# Patient Record
Sex: MALE | Race: WHITE | NOT HISPANIC OR LATINO | Employment: OTHER | URBAN - METROPOLITAN AREA
[De-identification: names, ages, dates, MRNs, and addresses within clinical notes are randomized per-mention and may not be internally consistent; named-entity substitution may affect disease eponyms.]

---

## 2017-12-19 NOTE — PROGRESS NOTES
SUBJECTIVE:   CC: Evan Rios is an 62 year old male who presents for preventative health visit.     Physical   Annual:     Getting at least 3 servings of Calcium per day::  Yes    Bi-annual eye exam::  Yes    Dental care twice a year::  Yes    Sleep apnea or symptoms of sleep apnea::  None    Diet::  Regular (no restrictions)    Frequency of exercise::  6-7 days/week    Duration of exercise::  15-30 minutes    Taking medications regularly::  Yes    Medication side effects::  None    Additional concerns today::  No          Right-sided jaw locking when stressed. Painful when locking??? No pain at rest, not tender to palpation. Was happening more during a stressful time of the year (discussing FDC), but now not so much. Able to close and open jaw okay. Not waking with headaches or teeth pain. Wondering if he needs surgery or some sort of treatment.  Denies overall increased stress or anxiety - reports good mood - but did have just a few weeks of increased stress where he noticed this.     Tinnitus - worsening on right side for past three months, no inciting event. Has had for ~40 years from work exposure. No hearing loss on right side (some on left side from diving). No pain, leaking of fluid. Some dizziness for last 5 years, comes and goes, he associates it with his electric toothbrush - not the tinnitus.    Lipitor - wondering if he can stop this. Doesn't take it every day and has been making lifestyle changes. Is fasting today and due for labs. Father had an MI at 43 yo.     The 10-year ASCVD risk score ([place] SERENA Jr, et al., 2013) is: 5.3%    Values used to calculate the score:      Age: 62 years      Sex: Male      Is Non- : No      Diabetic: No      Tobacco smoker: No      Systolic Blood Pressure: 110 mmHg      Is BP treated: No      HDL Cholesterol: 58 mg/dL      Total Cholesterol: 158 mg/dL      Wants to have a discussion about preventive/screening. Lung, prostate, colon,  bladder.       Today's PHQ-2 Score:   PHQ-2 ( 1999 Pfizer) 12/22/2017   Q1: Little interest or pleasure in doing things 0   Q2: Feeling down, depressed or hopeless 0   PHQ-2 Score 0   Q1: Little interest or pleasure in doing things Not at all   Q2: Feeling down, depressed or hopeless Not at all   PHQ-2 Score 0       Abuse: Current or Past(Physical, Sexual or Emotional)- No  Do you feel safe in your environment - Yes    Social History   Substance Use Topics     Smoking status: Never Smoker     Smokeless tobacco: Never Used     Alcohol use Yes     Alcohol Use 12/22/2017   If you drink alcohol, do you typically have greater than 3 drinks per day OR greater than 7 drinks per week?   No   No flowsheet data found.      Last PSA:   PSA   Date Value Ref Range Status   02/27/2013 1.10 ng/mL Final       Reviewed orders with patient. Reviewed health maintenance and updated orders accordingly - Yes  BP Readings from Last 3 Encounters:   12/22/17 110/78   11/09/16 120/80   10/09/15 120/82    Wt Readings from Last 3 Encounters:   12/22/17 168 lb 8 oz (76.4 kg)   11/09/16 162 lb 6.4 oz (73.7 kg)   10/09/15 169 lb 11.2 oz (77 kg)           Patient Active Problem List   Diagnosis     Hyperlipidemia with target LDL less than 130     Vertigo     Ganglion cyst     AK (actinic keratosis)     Past Surgical History:   Procedure Laterality Date     HERNIA REPAIR         Social History   Substance Use Topics     Smoking status: Never Smoker     Smokeless tobacco: Never Used     Alcohol use Yes     Family History   Problem Relation Age of Onset     Hypertension Father      Cardiovascular Father 42     heart attack         Recent Labs   Lab Test  11/09/16   1421  10/09/15   1059  02/21/14   0904 02/27/13 01/06/12  11/18/10   A1C   --    --    --   6.0  5.8   --   5.7   LDL  84  71  82  72  92   < >  91   HDL  58  58  51  46  48   < >  48   TRIG  79  72  86  84  113   < >  114   ALT  25  18  27  18  17   < >  18   CR  1.25  0.89  1.13   --    "1.09   --   1.13   GFRESTIMATED  59*  87  67   --    --    --    --    GFRESTBLACK  71  >90   GFR Calc    81   --    --    --    --    POTASSIUM  4.3  3.7  4.3   --   3.9   --   4.1   TSH  1.49   --    --    --   1.533   --   2.328    < > = values in this interval not displayed.        Reviewed and updated as needed this visit by clinical staffTobacco  Allergies  Meds  Problems  Med Hx  Surg Hx  Fam Hx  Soc Hx          Reviewed and updated as needed this visit by Provider  Allergies  Meds  Problems        Past Medical History:   Diagnosis Date     Hyperlipidemia LDL goal < 130       Past Surgical History:   Procedure Laterality Date     HERNIA REPAIR       Review of Systems  C: NEGATIVE for fever, chills, change in weight  I: NEGATIVE for worrisome rashes, moles or lesions  E: NEGATIVE for vision changes or irritation  ENT: NEGATIVE for ear, mouth and throat problems  R: NEGATIVE for significant cough or SOB  CV: NEGATIVE for chest pain, palpitations or peripheral edema  GI: NEGATIVE for nausea, abdominal pain, heartburn, or change in bowel habits   male: negative for dysuria, hematuria, decreased urinary stream, erectile dysfunction, urethral discharge  M: NEGATIVE for significant arthralgias or myalgia  N: NEGATIVE for weakness, dizziness or paresthesias  P: NEGATIVE for changes in mood or affect    OBJECTIVE:   /78 (BP Location: Left arm, Patient Position: Chair, Cuff Size: Adult Regular)  Pulse 68  Temp 97.5  F (36.4  C) (Temporal)  Resp 12  Ht 5' 9.25\" (1.759 m)  Wt 168 lb 8 oz (76.4 kg)  BMI 24.7 kg/m2    Physical Exam  GENERAL: healthy, alert and no distress  EYES: Eyes grossly normal to inspection, PERRL and conjunctivae and sclerae normal  HENT: ear canals and TM's normal, nose and mouth without ulcers or lesions  NECK: no adenopathy, no asymmetry, masses, or scars and thyroid normal to palpation  RESP: lungs clear to auscultation - no rales, rhonchi or wheezes  CV: " regular rate and rhythm, normal S1 S2, no S3 or S4, no murmur, click or rub, no peripheral edema and peripheral pulses strong  ABDOMEN: soft, nontender, no hepatosplenomegaly, no masses and bowel sounds normal  MS: no gross musculoskeletal defects noted, no edema  SKIN: no suspicious lesions or rashes  NEURO: Normal strength and tone, mentation intact and speech normal  PSYCH: mentation appears normal, affect normal/bright    ASSESSMENT/PLAN:       ICD-10-CM    1. Routine general medical examination at a health care facility Z00.00 Lipid panel reflex to direct LDL Fasting     *UA reflex to Microscopic and Culture ([facility] (except [place] and [place])   2. Hyperlipidemia with target LDL less than 130 E78.5 atorvastatin (LIPITOR) 40 MG tablet     aspirin 81 MG tablet   3. 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 Tinnitus, right H93.11    5. Need for prophylactic vaccination and inoculation against influenza Z23 FLU VAC, SPLIT VIRUS IM > 3 YO (QUADRIVALENT) [98066]     Vaccine Administration, Initial [20473]     1. Discussed current practice for prevention and screening. Patient is a healthy non-smoker. Will get UA today to screen for bladder cancer and other abnormalities. Patient is up to date on colonoscopy and has had Hep C screen. Discussed symptoms of various cancers he was wondering about (lung, colon, bladder, prostate, oral/esophageal) and prevention.   2. Patient was wondering if he still needed Lipitor but has family history of CVD and a history of hyperlipidemia without medication. He denies side effects and is happy to continue taking statin. Will also start ASA 81 mg.   3. Possibly some presentation of TMJ syndrome or a masseter muscle spasm. Patient denies symptoms currently and does not want any treatment at the moment. Discussed general stress reduction.   4. Offered hearing screening, patient declined. Will continue to monitor.   5.  Immunized.        COUNSELING:   Reviewed  "preventive health counseling, as reflected in patient instructions       Regular exercise       Healthy diet/nutrition       Immunizations    Vaccinated for: Influenza           Aspirin ProphylaxsisAlcohol UseOsteoporosis Prevention/Bone Health         Consider lung cancer screening for ages 55-80 years and 30 pack-year smoking history           Colon cancer screening       Prostate cancer screening                               reports that he has never smoked. He has never used smokeless tobacco.      Estimated body mass index is 24.7 kg/(m^2) as calculated from the following:    Height as of this encounter: 5' 9.25\" (1.759 m).    Weight as of this encounter: 168 lb 8 oz (76.4 kg).         Counseling Resources:  ATP IV Guidelines  Pooled Cohorts Equation Calculator  FRAX Risk Assessment  ICSI Preventive Guidelines  Dietary Guidelines for Americans, 2010  USDA's MyPlate  ASA Prophylaxis  Lung CA Screening    Patient seen and examined by myself and Dr. Alves. Note was then scribed by me.   Parul Young, MS3    This patient was seen and examined by myself as well as the medical student.  The medical student has scribed the note and I have reviewed it, edited it appropriately and agree with the final documentation. Electronically signed by MD Tenzin Durbin MD, MD  [facility]  "

## 2017-12-19 NOTE — PATIENT INSTRUCTIONS
Let us know if have further jaw issues or want to consider further evaluation for your tinnitus.    We'll let you know your lab results as soon as we can.     Contact us or return if questions or concerns.    Have a nice day!    Dr. Alves       Preventive Health Recommendations  Male Ages 50 - 64    Yearly exam:             See your health care provider every year in order to  o   Review health changes.   o   Discuss preventive care.    o   Review your medicines if your doctor has prescribed any.     Have a cholesterol test every 5 years, or more frequently if you are at risk for high cholesterol/heart disease.     Have a diabetes test (fasting glucose) every three years. If you are at risk for diabetes, you should have this test more often.     Have a colonoscopy at age 50, or have a yearly FIT test (stool test). These exams will check for colon cancer.      Talk with your health care provider about whether or not a prostate cancer screening test (PSA) is right for you.    You should be tested each year for STDs (sexually transmitted diseases), if you re at risk.     Shots: Get a flu shot each year. Get a tetanus shot every 10 years.     Nutrition:    Eat at least 5 servings of fruits and vegetables daily.     Eat whole-grain bread, whole-wheat pasta and brown rice instead of white grains and rice.     Talk to your provider about Calcium and Vitamin D.     Lifestyle    Exercise for at least 150 minutes a week (30 minutes a day, 5 days a week). This will help you control your weight and prevent disease.     Limit alcohol to one drink per day.     No smoking.     Wear sunscreen to prevent skin cancer.     See your dentist every six months for an exam and cleaning.     See your eye doctor every 1 to 2 years.

## 2017-12-22 ENCOUNTER — OFFICE VISIT (OUTPATIENT)
Dept: FAMILY MEDICINE | Facility: OTHER | Age: 62
End: 2017-12-22
Payer: COMMERCIAL

## 2017-12-22 VITALS
HEART RATE: 68 BPM | TEMPERATURE: 97.5 F | RESPIRATION RATE: 12 BRPM | DIASTOLIC BLOOD PRESSURE: 78 MMHG | SYSTOLIC BLOOD PRESSURE: 110 MMHG | HEIGHT: 69 IN | WEIGHT: 168.5 LBS | BODY MASS INDEX: 24.96 KG/M2

## 2017-12-22 DIAGNOSIS — Z00.00 ROUTINE GENERAL MEDICAL EXAMINATION AT A HEALTH CARE FACILITY: Primary | ICD-10-CM

## 2017-12-22 DIAGNOSIS — Z23 NEED FOR PROPHYLACTIC VACCINATION AND INOCULATION AGAINST INFLUENZA: ICD-10-CM

## 2017-12-22 DIAGNOSIS — H93.11 TINNITUS, RIGHT: ICD-10-CM

## 2017-12-22 DIAGNOSIS — M26.51 JAW CLOSURE ABNORMALITY: ICD-10-CM

## 2017-12-22 DIAGNOSIS — E78.5 HYPERLIPIDEMIA WITH TARGET LDL LESS THAN 130: ICD-10-CM

## 2017-12-22 LAB
ALBUMIN UR-MCNC: NEGATIVE MG/DL
APPEARANCE UR: CLEAR
BILIRUB UR QL STRIP: NEGATIVE
CHOLEST SERPL-MCNC: 142 MG/DL
COLOR UR AUTO: YELLOW
GLUCOSE UR STRIP-MCNC: NEGATIVE MG/DL
HDLC SERPL-MCNC: 51 MG/DL
HGB UR QL STRIP: ABNORMAL
KETONES UR STRIP-MCNC: NEGATIVE MG/DL
LDLC SERPL CALC-MCNC: 74 MG/DL
LEUKOCYTE ESTERASE UR QL STRIP: NEGATIVE
NITRATE UR QL: NEGATIVE
NONHDLC SERPL-MCNC: 91 MG/DL
PH UR STRIP: 7 PH (ref 5–7)
RBC #/AREA URNS AUTO: NORMAL /HPF
SOURCE: ABNORMAL
SP GR UR STRIP: 1.01 (ref 1–1.03)
TRIGL SERPL-MCNC: 84 MG/DL
UROBILINOGEN UR STRIP-ACNC: 0.2 EU/DL (ref 0.2–1)
WBC #/AREA URNS AUTO: NORMAL /HPF

## 2017-12-22 PROCEDURE — 81001 URINALYSIS AUTO W/SCOPE: CPT | Performed by: FAMILY MEDICINE

## 2017-12-22 PROCEDURE — 90686 IIV4 VACC NO PRSV 0.5 ML IM: CPT | Performed by: FAMILY MEDICINE

## 2017-12-22 PROCEDURE — 36415 COLL VENOUS BLD VENIPUNCTURE: CPT | Performed by: FAMILY MEDICINE

## 2017-12-22 PROCEDURE — 80061 LIPID PANEL: CPT | Performed by: FAMILY MEDICINE

## 2017-12-22 PROCEDURE — 90471 IMMUNIZATION ADMIN: CPT | Performed by: FAMILY MEDICINE

## 2017-12-22 PROCEDURE — 99396 PREV VISIT EST AGE 40-64: CPT | Mod: 25 | Performed by: FAMILY MEDICINE

## 2017-12-22 RX ORDER — ATORVASTATIN CALCIUM 40 MG/1
20 TABLET, FILM COATED ORAL DAILY
Qty: 90 TABLET | Refills: 3 | Status: SHIPPED | OUTPATIENT
Start: 2017-12-22 | End: 2019-02-06

## 2017-12-22 ASSESSMENT — PAIN SCALES - GENERAL: PAINLEVEL: NO PAIN (0)

## 2017-12-22 NOTE — PROGRESS NOTES
Evan,    All of your labs were normal for you other than possible blood in your urine (chemical test for blood was positive, but not much seen on microscopy).  I would recommend we recheck this in a few weeks to see if it's real/persistent.  Let me know if you're OK with this.    Have a nice day!    Dr. Alves

## 2017-12-22 NOTE — NURSING NOTE
"Chief Complaint   Patient presents with     Physical     Panel Management     Honoring Choices, flu       Initial /78 (BP Location: Left arm, Patient Position: Chair, Cuff Size: Adult Regular)  Pulse 68  Temp 97.5  F (36.4  C) (Temporal)  Resp 12  Ht 5' 9.25\" (1.759 m)  Wt 168 lb 8 oz (76.4 kg)  BMI 24.7 kg/m2 Estimated body mass index is 24.7 kg/(m^2) as calculated from the following:    Height as of this encounter: 5' 9.25\" (1.759 m).    Weight as of this encounter: 168 lb 8 oz (76.4 kg).  Medication Reconciliation: complete  Sharonda Leiva, JAYDEN    "

## 2017-12-22 NOTE — MR AVS SNAPSHOT
After Visit Summary   12/22/2017    Evan Rios    MRN: [id number]           Patient Information     Date Of Birth          1955        Visit Information        Provider Department      12/22/2017 9:30 AM Tenzin Alves MD [facility]        Today's Diagnoses     Routine general medical examination at a health care facility    -  1    Hyperlipidemia with target LDL less than 130        Jaw closure abnormality        Tinnitus, right        Need for prophylactic vaccination and inoculation against influenza          Care Instructions    Let us know if have further jaw issues or want to consider further evaluation for your tinnitus.    We'll let you know your lab results as soon as we can.     Contact us or return if questions or concerns.    Have a nice day!    Dr. Alves       Preventive Health Recommendations  Male Ages 50 - 64    Yearly exam:             See your health care provider every year in order to  o   Review health changes.   o   Discuss preventive care.    o   Review your medicines if your doctor has prescribed any.     Have a cholesterol test every 5 years, or more frequently if you are at risk for high cholesterol/heart disease.     Have a diabetes test (fasting glucose) every three years. If you are at risk for diabetes, you should have this test more often.     Have a colonoscopy at age 50, or have a yearly FIT test (stool test). These exams will check for colon cancer.      Talk with your health care provider about whether or not a prostate cancer screening test (PSA) is right for you.    You should be tested each year for STDs (sexually transmitted diseases), if you re at risk.     Shots: Get a flu shot each year. Get a tetanus shot every 10 years.     Nutrition:    Eat at least 5 servings of fruits and vegetables daily.     Eat whole-grain bread, whole-wheat pasta and brown rice instead of white grains and rice.     Talk to your provider about Calcium  "and Vitamin D.     Lifestyle    Exercise for at least 150 minutes a week (30 minutes a day, 5 days a week). This will help you control your weight and prevent disease.     Limit alcohol to one drink per day.     No smoking.     Wear sunscreen to prevent skin cancer.     See your dentist every six months for an exam and cleaning.     See your eye doctor every 1 to 2 years.            Follow-ups after your visit        Who to contact     If you have questions or need follow up information about today's clinic visit or your schedule please contact [facility] directly at [phone].  Normal or non-critical lab and imaging results will be communicated to you by [facility], letter or phone within 4 business days after the clinic has received the results. If you do not hear from us within 7 days, please contact the clinic through [employer] or phone. If you have a critical or abnormal lab result, we will notify you by phone as soon as possible.  Submit refill requests through [employer] or call your pharmacy and they will forward the refill request to us. Please allow 3 business days for your refill to be completed.          Additional Information About Your Visit        MyChart Information     [employer] gives you secure access to your electronic health record. If you see a primary care provider, you can also send messages to your care team and make appointments. If you have questions, please call your primary care clinic.  If you do not have a primary care provider, please call [phone] and they will assist you.        Care EveryWhere ID     This is your Care EveryWhere ID. This could be used by other organizations to access your [facility] medical records  [id number]        Your Vitals Were     Pulse Temperature Respirations Height BMI (Body Mass Index)       68 97.5  F (36.4  C) (Temporal) 12 5' 9.25\" (1.759 m) 24.7 kg/m2        Blood Pressure from Last 3 Encounters:   12/22/17 110/78   11/09/16 120/80 "   10/09/15 120/82    Weight from Last 3 Encounters:   12/22/17 168 lb 8 oz (76.4 kg)   11/09/16 162 lb 6.4 oz (73.7 kg)   10/09/15 169 lb 11.2 oz (77 kg)              We Performed the Following     *UA reflex to Microscopic and Culture ([place] [facility] (except [place] and [place])     FLU VAC, SPLIT VIRUS IM > 3 YO (QUADRIVALENT) [06789]     Lipid panel reflex to direct LDL Fasting     Vaccine Administration, Initial [46901]          Today's Medication Changes          These changes are accurate as of: 12/22/17 10:25 AM.  If you have any questions, ask your nurse or doctor.               Start taking these medicines.        Dose/Directions    aspirin 81 MG tablet   Used for:  Hyperlipidemia with target LDL less than 130   Started by:  Tenzin Alves MD        Dose:  81 mg   Take 1 tablet (81 mg) by mouth daily   Quantity:  90 tablet   Refills:  3            Where to get your medicines      These medications were sent to [facility] [place] - [address]   [address] [place] [address]     Phone:  [phone]     aspirin 81 MG tablet    atorvastatin 40 MG tablet                Primary Care Provider Office Phone # Fax #    Tenzin Alves [phone] [address] [place] [address]        Equal Access to Services     [facility] AH: Hadii aad ku hadasho Soomaali, waaxda luqadaha, qaybta kaalmada adeegyada, waxay edmondin hayledy valle. So [facility] [phone].    ATENCIÓN: Si habla español, tiene a taylor disposición servicios gratuitos de asistencia lingüística. Llame al [phone].    We comply with applicable federal civil rights laws and [place] laws. We do not discriminate on the basis of race, color, national origin, age, disability, sex, sexual orientation, or gender identity.            Thank you!     Thank you for choosing [facility]  for your care. Our goal is always to provide you with  excellent care. Hearing back from our patients is one way we can continue to improve our services. Please take a few minutes to complete the written survey that you may receive in the mail after your visit with us. Thank you!             Your Updated Medication List - Protect others around you: Learn how to safely use, store and throw away your medicines at www.disposemymeds.org.          This list is accurate as of: 12/22/17 10:25 AM.  Always use your most recent med list.                   Brand Name Dispense Instructions for use Diagnosis    aspirin 81 MG tablet     90 tablet    Take 1 tablet (81 mg) by mouth daily    Hyperlipidemia with target LDL less than 130       atorvastatin 40 MG tablet    LIPITOR    90 tablet    Take 0.5 tablets (20 mg) by mouth daily    Hyperlipidemia with target LDL less than 130

## 2017-12-22 NOTE — PROGRESS NOTES
Injectable Influenza Immunization Documentation    1.  Is the person to be vaccinated sick today?   No    2. Does the person to be vaccinated have an allergy to a component   of the vaccine?   No  Egg Allergy Algorithm Link    3. Has the person to be vaccinated ever had a serious reaction   to influenza vaccine in the past?   No    4. Has the person to be vaccinated ever had Guillain-Barré syndrome?   No    Form completed by Sharonda Leiva CMA  Per orders of Dr. Alves, injection of Flu shot given by Sharonda Leiva CMA. Patient instructed to remain in clinic for 15 minutes afterwards, and to report any adverse reaction to me immediately.

## 2017-12-23 ENCOUNTER — MYC MEDICAL ADVICE (OUTPATIENT)
Dept: FAMILY MEDICINE | Facility: OTHER | Age: 62
End: 2017-12-23

## 2017-12-23 DIAGNOSIS — R31.29 MICROSCOPIC HEMATURIA: Primary | ICD-10-CM

## 2017-12-26 NOTE — TELEPHONE ENCOUNTER
Spoke with patient appointment scheduled for 1/15/2018, please place appropriate lab orders  Thank you  Melisa GRIMM (R)

## 2018-01-15 DIAGNOSIS — R31.29 MICROSCOPIC HEMATURIA: ICD-10-CM

## 2018-01-15 LAB
ALBUMIN UR-MCNC: NEGATIVE MG/DL
APPEARANCE UR: CLEAR
BACTERIA #/AREA URNS HPF: ABNORMAL /HPF
BILIRUB UR QL STRIP: NEGATIVE
COLOR UR AUTO: YELLOW
GLUCOSE UR STRIP-MCNC: NEGATIVE MG/DL
HGB UR QL STRIP: ABNORMAL
HYALINE CASTS #/AREA URNS LPF: ABNORMAL /LPF
KETONES UR STRIP-MCNC: NEGATIVE MG/DL
LEUKOCYTE ESTERASE UR QL STRIP: NEGATIVE
NITRATE UR QL: NEGATIVE
NON-SQ EPI CELLS #/AREA URNS LPF: ABNORMAL /LPF
PH UR STRIP: 6.5 PH (ref 5–7)
RBC #/AREA URNS AUTO: ABNORMAL /HPF
SOURCE: ABNORMAL
SP GR UR STRIP: 1.02 (ref 1–1.03)
UROBILINOGEN UR STRIP-ACNC: 0.2 EU/DL (ref 0.2–1)
WBC #/AREA URNS AUTO: ABNORMAL /HPF

## 2018-01-15 PROCEDURE — 81001 URINALYSIS AUTO W/SCOPE: CPT | Performed by: FAMILY MEDICINE

## 2018-01-16 ENCOUNTER — MYC MEDICAL ADVICE (OUTPATIENT)
Dept: FAMILY MEDICINE | Facility: OTHER | Age: 63
End: 2018-01-16

## 2018-01-16 DIAGNOSIS — R31.29 MICROSCOPIC HEMATURIA: Primary | ICD-10-CM

## 2018-01-16 NOTE — PROGRESS NOTES
Evan,    Still a bit of microscopic blood in your urine.  It might be a good idea for you to see urology and have a cystoscopy to evaluate.  Let me know if you'd like to pursue this.    Thanks,    Dr. Alves

## 2018-01-16 NOTE — TELEPHONE ENCOUNTER
[facility], please inform patient that referral for Urology has been placed also responded via Rolando Loja RT (R)

## 2018-01-17 NOTE — TELEPHONE ENCOUNTER
[facility] message read with no response.  Will close encounter at this time.    Niraj Blake, RN, BSN

## 2018-01-25 ENCOUNTER — OFFICE VISIT (OUTPATIENT)
Dept: UROLOGY | Facility: OTHER | Age: 63
End: 2018-01-25
Payer: COMMERCIAL

## 2018-01-25 VITALS — SYSTOLIC BLOOD PRESSURE: 122 MMHG | DIASTOLIC BLOOD PRESSURE: 86 MMHG

## 2018-01-25 DIAGNOSIS — N40.0 BENIGN PROSTATIC HYPERPLASIA WITHOUT LOWER URINARY TRACT SYMPTOMS: ICD-10-CM

## 2018-01-25 DIAGNOSIS — R03.0 ELEVATED BLOOD PRESSURE READING WITHOUT DIAGNOSIS OF HYPERTENSION: ICD-10-CM

## 2018-01-25 DIAGNOSIS — R31.29 MICROSCOPIC HEMATURIA: Primary | ICD-10-CM

## 2018-01-25 LAB — PSA SERPL-MCNC: 1.86 UG/L (ref 0–4)

## 2018-01-25 PROCEDURE — 36415 COLL VENOUS BLD VENIPUNCTURE: CPT | Performed by: UROLOGY

## 2018-01-25 PROCEDURE — 84153 ASSAY OF PSA TOTAL: CPT | Performed by: UROLOGY

## 2018-01-25 PROCEDURE — 99202 OFFICE O/P NEW SF 15 MIN: CPT | Performed by: UROLOGY

## 2018-01-25 NOTE — MR AVS SNAPSHOT
After Visit Summary   1/25/2018    Evan Rios    MRN: [id number]           Patient Information     Date Of Birth          1955        Visit Information        Provider Department      1/25/2018 8:45 AM Stevenson Pandey MD [facility]        Today's Diagnoses     Microscopic hematuria    -  1    Benign prostatic hyperplasia without lower urinary tract symptoms          Care Instructions    Your cystoscopy is scheduled 2/8/2018 @ 8:15am. No preparation necessary. Please call  if you need to reschedule this appointment. Please complete your renal ultrasound sometime prior to your appointment for cystoscopy.   Please call  to schedule your renal ultrasound.       Cystoscopy    Cystoscopy is a procedure that lets your doctor look directly inside your urethra and bladder. It can be used to:    Help diagnose a problem with your urethra, bladder, or kidneys.    Take a sample (biopsy) of bladder or urethral tissue.    Treat certain problems (such as removing kidney stones).    Place a stent to bypass an obstruction.    Take special X-rays of the kidneys.  Based on the findings, your doctor may recommend other tests or treatments.  What is a cystoscope?  A cystoscope is a telescope-like instrument that contains lenses and fiberoptics (small glass wires that make bright light). The cystoscope may be straight and rigid, or flexible to bend around curves in the urethra. The doctor may look directly into the cystoscope, or project the image onto a monitor.  Getting ready    Ask your doctor if you should stop taking any medicines before the procedure.    Ask whether you should avoid eating or drinking anything after midnight before the procedure.    Follow any other instructions your doctor gives you.  Tell your doctor before the exam if you:    Take any medicines, such as aspirin or blood thinners    Have allergies to any medicines    Are pregnant   The  procedure  Cystoscopy is done in the doctor s office, surgery center, or hospital. The doctor and a nurse are present during the procedure. It takes only a few minutes, longer if a biopsy, X-ray, or treatment needs to be done.  During the procedure:    You lie on an exam table on your back, knees bent and legs apart. You are covered with a drape.    Your urethra and the area around it are washed. Anesthetic jelly may be applied to numb the urethra. Other pain medicine is usually not needed. In some cases, you may be offered a mild sedative to help you relax. If a more extensive procedure is to be done, such as a biopsy or kidney stone removal, general anesthesia may be needed.    The cystoscope is inserted. A sterile fluid is put into the bladder to expand it. You may feel pressure from this fluid.    When the procedure is done, the cystoscope is removed.  After the procedure  If you had a sedative, general anesthesia, or spinal anesthesia, you must have someone drive you home. Once you re home:    Drink plenty of fluids.    You may have burning or light bleeding when you urinate--this is normal.    Medicines may be prescribed to ease any discomfort or prevent infection. Take these as directed.    Call your doctor if you have heavy bleeding or blood clots, burning that lasts more than a day, a fever over 100 F  (38  C), or trouble urinating.  Date Last Reviewed: 1/1/2017 2000-2017 [employer]. [address] [place] [address]. All rights reserved. This information is not intended as a substitute for professional medical care. Always follow your healthcare professional's instructions.                Follow-ups after your visit        Your next 10 appointments already scheduled     Feb 08, 2018  8:15 AM CST   [facility] Visit with Stevenson Pandey MD   [facility]    [address] [facility] [address] [phone]              Future tests that  were ordered for you today     Open Future Orders        Priority Expected Expires Ordered    US Renal Complete [[id number]] Routine  1/25/2019 1/25/2018            Who to contact     If you have questions or need follow up information about today's clinic visit or your schedule please contact [facility] directly at [phone].  Normal or non-critical lab and imaging results will be communicated to you by MyChart, letter or phone within 4 business days after the clinic has received the results. If you do not hear from us within 7 days, please contact the clinic through [email or web address] or phone. If you have a critical or abnormal lab result, we will notify you by phone as soon as possible.  Submit refill requests through [employer] or call your pharmacy and they will forward the refill request to us. Please allow 3 business days for your refill to be completed.          Additional Information About Your Visit        MyChart Information     [employer] gives you secure access to your electronic health record. If you see a primary care provider, you can also send messages to your care team and make appointments. If you have questions, please call your primary care clinic.  If you do not have a primary care provider, please call [phone] and they will assist you.        Care EveryWhere ID     This is your Care EveryWhere ID. This could be used by other organizations to access your [place] medical records  [id number]         Blood Pressure from Last 3 Encounters:   01/25/18 122/86   12/22/17 110/78   11/09/16 120/80    Weight from Last 3 Encounters:   12/22/17 76.4 kg (168 lb 8 oz)   11/09/16 73.7 kg (162 lb 6.4 oz)   10/09/15 77 kg (169 lb 11.2 oz)              We Performed the Following     PSA tumor marker        Primary Care Provider Office Phone # Fax #    Tenzin Alves [phone] [address] [place] [id number]        Equal Access to Services     HAN CALDWELL: Que starkey  federico Sanford, watrinidadda talyaadaha, qavanesata kamatias lexyashok, waxjeanie ana maria aidensixto pughdonald rosakaty laShayledy tori. So [facility] [phone].    ATENCIÓN: Si habla español, tiene a taylor disposición servicios gratuitos de asistencia lingüística. Joycelyn al [phone].    We comply with applicable federal civil rights laws and [place] laws. We do not discriminate on the basis of race, color, national origin, age, disability, sex, sexual orientation, or gender identity.            Thank you!     Thank you for choosing [facility]  for your care. Our goal is always to provide you with excellent care. Hearing back from our patients is one way we can continue to improve our services. Please take a few minutes to complete the written survey that you may receive in the mail after your visit with us. Thank you!             Your Updated Medication List - Protect others around you: Learn how to safely use, store and throw away your medicines at www.disposemymeds.org.          This list is accurate as of 1/25/18  9:06 AM.  Always use your most recent med list.                   Brand Name Dispense Instructions for use Diagnosis    aspirin 81 MG tablet     90 tablet    Take 1 tablet (81 mg) by mouth daily    Hyperlipidemia with target LDL less than 130       atorvastatin 40 MG tablet    LIPITOR    90 tablet    Take 0.5 tablets (20 mg) by mouth daily    Hyperlipidemia with target LDL less than 130

## 2018-01-25 NOTE — PROGRESS NOTES
Urology Note            S:  Evan Rios is a 62 year old male who was seen in a consultation at the request of Dr. Alves for hematuria.   Patient has microscopic hematuria.  He has no other voiding in addition to hematuria.  He has no flank pain.  He has no history of kidney stone.  He denies any trauma.  Recent UA showed 2-5 rbc/hpf.  He is not a smoker.  Past Medical History:   Diagnosis Date     Hyperlipidemia LDL goal < 130      Current Outpatient Prescriptions   Medication Sig Dispense Refill     atorvastatin (LIPITOR) 40 MG tablet Take 0.5 tablets (20 mg) by mouth daily 90 tablet 3     aspirin 81 MG tablet Take 1 tablet (81 mg) by mouth daily 90 tablet 3     Past Surgical History:   Procedure Laterality Date     HERNIA REPAIR        Social History     Social History     Marital status:      Spouse name: N/A     Number of children: N/A     Years of education: N/A     Occupational History     Not on file.     Social History Main Topics     Smoking status: Never Smoker     Smokeless tobacco: Never Used     Alcohol use Yes     Drug use: No     Sexual activity: Yes     Partners: Female     Other Topics Concern     Parent/Sibling W/ Cabg, Mi Or Angioplasty Before 65f 55m? No     Social History Narrative     Family History   Problem Relation Age of Onset     Hypertension Father      Cardiovascular Father 42     heart attack          REVIEW OF SYSTEMS  =================  C: NEGATIVE for fever, chills, change in weight  I: NEGATIVE for worrisome rashes, moles or lesions  E/M: NEGATIVE for ear, mouth and throat problems  R: NEGATIVE for significant cough or SHORTNESS OF BREATH  CV:  NEGATIVE for chest pain, palpitations or peripheral edema  GI: NEGATIVE for nausea, abdominal pain, heartburn, or change in bowel habits  NEURO: NEGATIVE numbness/weakness  : see HPI  PSYCH: NEGATIVE depression/anxiety  LYmph: no new enlarged lymph nodes  Ortho: no new trauma/movements           O: Exam:  Constitutional:  healthy, alert and no distress  Head: Normocephalic. No masses, lesions, tenderness or abnormalities  ENT: ENT exam normal, no neck nodes or sinus tenderness  Cardiovascular: negative, PMI normal.   Respiratory: negative, no evidence of respiratory distress  Gastrointestinal: Abdomen soft, non-tender. BS normal. No masses, organomegaly  : penis no d/c. Testis no masses.  No scrotal skin lesion.  Prostate 40 gm smooth.  Musculoskeletal: extremities normal- no gross deformities noted, gait normal and normal muscle tone  Skin: no suspicious lesions or rashes  Neurologic: Alert and oriented  Psychiatric: mentation appears normal. and affect normal/bright  Hematologic/Lymphatic/Immunologic: normal ant/post cervical, axillary, supraclavicular and inguinal nodes    Assessment:  Hematuria, microscopic                          BPH                          Elevated bp: Patient to follow up with Primary Care provider regarding elevated blood pressure.    Recommendation: Schedule patient for renal ultrasound/cysto next.  psa testing today.

## 2018-01-25 NOTE — PATIENT INSTRUCTIONS
Your cystoscopy is scheduled 2/8/2018 @ 8:15am. No preparation necessary. Please call  if you need to reschedule this appointment. Please complete your renal ultrasound sometime prior to your appointment for cystoscopy.   Please call  to schedule your renal ultrasound.       Cystoscopy    Cystoscopy is a procedure that lets your doctor look directly inside your urethra and bladder. It can be used to:    Help diagnose a problem with your urethra, bladder, or kidneys.    Take a sample (biopsy) of bladder or urethral tissue.    Treat certain problems (such as removing kidney stones).    Place a stent to bypass an obstruction.    Take special X-rays of the kidneys.  Based on the findings, your doctor may recommend other tests or treatments.  What is a cystoscope?  A cystoscope is a telescope-like instrument that contains lenses and fiberoptics (small glass wires that make bright light). The cystoscope may be straight and rigid, or flexible to bend around curves in the urethra. The doctor may look directly into the cystoscope, or project the image onto a monitor.  Getting ready    Ask your doctor if you should stop taking any medicines before the procedure.    Ask whether you should avoid eating or drinking anything after midnight before the procedure.    Follow any other instructions your doctor gives you.  Tell your doctor before the exam if you:    Take any medicines, such as aspirin or blood thinners    Have allergies to any medicines    Are pregnant   The procedure  Cystoscopy is done in the doctor s office, surgery center, or hospital. The doctor and a nurse are present during the procedure. It takes only a few minutes, longer if a biopsy, X-ray, or treatment needs to be done.  During the procedure:    You lie on an exam table on your back, knees bent and legs apart. You are covered with a drape.    Your urethra and the area around it are washed. Anesthetic jelly may be applied to numb the  urethra. Other pain medicine is usually not needed. In some cases, you may be offered a mild sedative to help you relax. If a more extensive procedure is to be done, such as a biopsy or kidney stone removal, general anesthesia may be needed.    The cystoscope is inserted. A sterile fluid is put into the bladder to expand it. You may feel pressure from this fluid.    When the procedure is done, the cystoscope is removed.  After the procedure  If you had a sedative, general anesthesia, or spinal anesthesia, you must have someone drive you home. Once you re home:    Drink plenty of fluids.    You may have burning or light bleeding when you urinate--this is normal.    Medicines may be prescribed to ease any discomfort or prevent infection. Take these as directed.    Call your doctor if you have heavy bleeding or blood clots, burning that lasts more than a day, a fever over 100 F  (38  C), or trouble urinating.  Date Last Reviewed: 1/1/2017 2000-2017 [employer]. [address] [place] [address], [place] [address]. All rights reserved. This information is not intended as a substitute for professional medical care. Always follow your healthcare professional's instructions.

## 2018-01-30 ENCOUNTER — HOSPITAL ENCOUNTER (OUTPATIENT)
Dept: ULTRASOUND IMAGING | Facility: CLINIC | Age: 63
Discharge: HOME | End: 2018-01-30
Attending: UROLOGY | Admitting: UROLOGY
Payer: COMMERCIAL

## 2018-01-30 DIAGNOSIS — R31.29 MICROSCOPIC HEMATURIA: ICD-10-CM

## 2018-01-30 PROCEDURE — 76770 US EXAM ABDO BACK WALL COMP: CPT

## 2018-02-14 ENCOUNTER — OFFICE VISIT (OUTPATIENT)
Dept: UROLOGY | Facility: OTHER | Age: 63
End: 2018-02-14
Payer: COMMERCIAL

## 2018-02-14 VITALS — HEART RATE: 68 BPM | OXYGEN SATURATION: 98 % | SYSTOLIC BLOOD PRESSURE: 142 MMHG | DIASTOLIC BLOOD PRESSURE: 70 MMHG

## 2018-02-14 DIAGNOSIS — R31.29 MICROSCOPIC HEMATURIA: Primary | ICD-10-CM

## 2018-02-14 DIAGNOSIS — N28.1 RENAL CYST: ICD-10-CM

## 2018-02-14 PROCEDURE — 52000 CYSTOURETHROSCOPY: CPT | Performed by: UROLOGY

## 2018-02-14 NOTE — PROGRESS NOTES
S: Evan Rios is a 62 year old male returns for hematuria.    Patient is draped and prepped.  Flexible cystoscopy placed under direct vision.      The anterior urethra is normal   The prostatic urethra showed trilobar enlargement.     The length is 2cm,  the coaptation is 2 cm.     In the bladder there is trabeculation grade 1.    RENAL ULTRASOUND   1/30/2018 9:27 AM     HISTORY: Microscopic hematuria.    COMPARISON: None.    FINDINGS:  The kidneys measure 13.0 x 5.5 x 5.3 cm on the right and  13.6 x 6.2 x 5.7 cm on the left. Cortex measures 2.3 cm in thickness  on the right and 2.2 cm in thickness on the left. Mildly complex  cystic structures with thin septations in the upper outer right kidney  measure 1.8 x 1.5 x 1.5 cm and in the right lower renal pole measuring  2.1 x 1.5 x 2.1 cm. A simple-appearing cyst in the mid right kidney  measures 0.9 x 0.8 x 0.9 cm. Complex cyst within septations measuring  3.0 x 3.3 x 2.7 seen is seen in the mid left kidney. Septations of the  complex cystic structure in the superior right kidney and the mid left  kidney do contain vascularity.    Kidneys are otherwise unremarkable. No hydronephrosis is seen.    The visualized portions of the urinary bladder appear normal.  Prevoid  and postvoid urinary bladder volumes are 44 mL and 9 mL respectively.   Left ureteral jet is seen. Right ureteral jet is not definitely  identified.               Impression             IMPRESSION:   1. Mildly complex cystic masses (Bosniak category 2F) upper outer  right kidney and mid left kidney and in the inferior pole of the right  kidney (Bosniak category 2). Six-month followup ultrasound is  recommended.  2. Simple-appearing cystic structure right lateral kidney is a Bosniak  category 1.  3. Left ureteral jet is seen. Right ureteral jet is not definitely  identified on this study.  4. No definite evidence for urinary system obstruction. No definite  etiology for patient's hematuria is  seen.    BRANDY GRIGGS MD        Assessment/Plan:  (R31.29) Microscopic hematuria  (primary encounter diagnosis)  Comment:    Plan: neg urological evaluation           reassurance    (N28.1) Renal cyst  Comment:    Plan: repeat renal ultrasound in six months

## 2018-02-14 NOTE — PATIENT INSTRUCTIONS
Please follow up in 6 months with Dr. Pandey. Please complete your renal ultrasound prior to your return appointment.  Please call [phone] to schedule the ultrasound. It can be done in [facility], or [facility].    Drink 12oz water prior to your ultrasound. Do not urinate prior to the test.  Please call our office if you have questions, [phone].

## 2018-02-14 NOTE — MR AVS SNAPSHOT
After Visit Summary   2/14/2018    Evan Rios    MRN: [id number]           Patient Information     Date Of Birth          1955        Visit Information        Provider Department      2/14/2018 10:00 AM Stevenson Pandey MD [facility]        Today's Diagnoses     Microscopic hematuria    -  1    Renal cyst          Care Instructions    Please follow up in 6 months with Dr. Pandey. Please complete your renal ultrasound prior to your return appointment.  Please call [phone] to schedule the ultrasound. It can be done in [place], [facility], or [facility].    Drink 12oz water prior to your ultrasound. Do not urinate prior to the test.  Please call our office if you have questions, [phone].          Follow-ups after your visit        Who to contact     If you have questions or need follow up information about today's clinic visit or your schedule please contact [facility] directly at [phone].  Normal or non-critical lab and imaging results will be communicated to you by [employer], letter or phone within 4 business days after the clinic has received the results. If you do not hear from us within 7 days, please contact the clinic through [employer] or phone. If you have a critical or abnormal lab result, we will notify you by phone as soon as possible.  Submit refill requests through [employer] or call your pharmacy and they will forward the refill request to us. Please allow 3 business days for your refill to be completed.          Additional Information About Your Visit        [employer] Information     [employer] gives you secure access to your electronic health record. If you see a primary care provider, you can also send messages to your care team and make appointments. If you have questions, please call your primary care clinic.  If you do not have a primary care provider, please call [phone] and they will assist you.        Care EveryWhere  ID     This is your Care EveryWhere ID. This could be used by other organizations to access your [place] medical records  [id number]        Your Vitals Were     Pulse Pulse Oximetry                68 98%           Blood Pressure from Last 3 Encounters:   02/14/18 142/70   01/25/18 122/86   12/22/17 110/78    Weight from Last 3 Encounters:   12/22/17 168 lb 8 oz (76.4 kg)   11/09/16 162 lb 6.4 oz (73.7 kg)   10/09/15 169 lb 11.2 oz (77 kg)              We Performed the Following     CYSTOURETHROSCOPY        Primary Care Provider Office Phone # Fax #    Tenzin Alves [phone] [address] [place] [address]        Equal Access to Services     [facility] : Hadii jaky caballero hadasho Soomaali, waaxda luqadaha, qaybta kaalmada adeegyada, waxay idiin hayledy cordova . So [facility] [phone].    ATENCIÓN: Si habla español, tiene a taylor disposición servicios gratuitos de asistencia lingüística. [facility] [phone].    We comply with applicable federal civil rights laws and [place] laws. We do not discriminate on the basis of race, color, national origin, age, disability, sex, sexual orientation, or gender identity.            Thank you!     Thank you for choosing [facility]  for your care. Our goal is always to provide you with excellent care. Hearing back from our patients is one way we can continue to improve our services. Please take a few minutes to complete the written survey that you may receive in the mail after your visit with us. Thank you!             Your Updated Medication List - Protect others around you: Learn how to safely use, store and throw away your medicines at www.disposemymeds.org.          This list is accurate as of 2/14/18 10:16 AM.  Always use your most recent med list.                   Brand Name Dispense Instructions for use Diagnosis    aspirin 81 MG tablet     90 tablet    Take 1 tablet (81 mg) by mouth daily    Hyperlipidemia  with target LDL less than 130       atorvastatin 40 MG tablet    LIPITOR    90 tablet    Take 0.5 tablets (20 mg) by mouth daily    Hyperlipidemia with target LDL less than 130

## 2018-02-28 ENCOUNTER — MYC MEDICAL ADVICE (OUTPATIENT)
Dept: FAMILY MEDICINE | Facility: OTHER | Age: 63
End: 2018-02-28

## 2018-02-28 NOTE — TELEPHONE ENCOUNTER
Dr Alves, are you ok with having pt come in on float scheduled for hearing test only or would you prefer to have him do a follow up office visit with you and do hearing test at that office visit. Please review and advise. Will then mychart pt.    Yusra Garcia CMA ([facility])

## 2018-03-01 NOTE — TELEPHONE ENCOUNTER
Spoke with patient appointment scheduled     3/2/2018 9:30 nurse only   Closing encounter  Melisa Loja RT (R)

## 2018-03-02 ENCOUNTER — ALLIED HEALTH/NURSE VISIT (OUTPATIENT)
Dept: FAMILY MEDICINE | Facility: OTHER | Age: 63
End: 2018-03-02
Payer: COMMERCIAL

## 2018-03-02 DIAGNOSIS — H90.6 MIXED CONDUCTIVE AND SENSORINEURAL HEARING LOSS OF BOTH EARS: Primary | ICD-10-CM

## 2018-03-02 PROCEDURE — 92551 PURE TONE HEARING TEST AIR: CPT

## 2018-03-02 PROCEDURE — 99207 ZZC NO CHARGE NURSE ONLY: CPT

## 2018-03-02 NOTE — NURSING NOTE
Chief Complaint   Patient presents with     Hearing Screening       HEARING FREQUENCY    Right Ear:      1000 Hz RESPONSE- on Level: 60 db (Conditioning sound) could not hear at 40  500 Hz: RESPONSE- on Level: 25 db   1000 Hz: RESPONSE- on Level:   20 db    2000 Hz: RESPONSE- on Level: 25 db   4000 Hz: RESPONSE- on Level: 60 db  6000 Hz: RESPONSE- on Level: 45 db    Left Ear:     6000 Hz: RESPONSE- on Level: 85 db   4000 Hz: RESPONSE- on Level: 80 db   2000 Hz: RESPONSE- on Level:   20 db    1000 Hz: RESPONSE- on Level:   20 db     500 Hz: RESPONSE- on Level: 30 db    Hearing Acuity: REFER    Hearing Assessment: abnormal--refer to audiology per Dr Alves. Referral placed and number to schedule given to ptDinah Garcia CMA ([facility])

## 2018-03-02 NOTE — MR AVS SNAPSHOT
After Visit Summary   3/2/2018    Evan Rios    MRN: [id number]           Patient Information     Date Of Birth          1955        Visit Information        Provider Department      3/2/2018 9:30 AM NL FLOAT NURSE [facility]        Today's Diagnoses     Mixed conductive and sensorineural hearing loss of both ears    -  1       Follow-ups after your visit        Additional Services     AUDIOLOGY ADULT REFERRAL       Your provider has referred you to: [facility]: [facility] [phone]   [email or web address]    Specialty Testing:  Audiogram w/Tymps and Reflexes (Comprehensive Audiology Evaluation) and Tinnitus Evaluation ([facility] Locations Only)                  Who to contact     If you have questions or need follow up information about today's clinic visit or your schedule please contact [facility] directly at [phone].  Normal or non-critical lab and imaging results will be communicated to you by MyChart, letter or phone within 4 business days after the clinic has received the results. If you do not hear from us within 7 days, please contact the clinic through MobiVitahart or phone. If you have a critical or abnormal lab result, we will notify you by phone as soon as possible.  Submit refill requests through IntelliMat or call your pharmacy and they will forward the refill request to us. Please allow 3 business days for your refill to be completed.          Additional Information About Your Visit        MyChart Information     IntelliMat gives you secure access to your electronic health record. If you see a primary care provider, you can also send messages to your care team and make appointments. If you have questions, please call your primary care clinic.  If you do not have a primary care provider, please call [phone] and they will assist you.        Care EveryWhere ID     This is your Care EveryWhere ID. This could  be used by other organizations to access your [place] medical records  [id number]         Blood Pressure from Last 3 Encounters:   02/14/18 142/70   01/25/18 122/86   12/22/17 110/78    Weight from Last 3 Encounters:   12/22/17 168 lb 8 oz (76.4 kg)   11/09/16 162 lb 6.4 oz (73.7 kg)   10/09/15 169 lb 11.2 oz (77 kg)              We Performed the Following     AUDIOLOGY ADULT REFERRAL        Primary Care Provider Office Phone # Fax #    Tenzin Alves [phone] [address] [place] [address]        Equal Access to Services     [facility]: Hadii jaky caballero hadasho Soramuali, waaxda luqadaha, qaybta kaalmada adeegyada, jeannette valle. So [facility] [phone].    ATENCIÓN: Si habla español, tiene a taylor disposición servicios gratuitos de asistencia lingüística. Llame al [phone].    We comply with applicable federal civil rights laws and [place] laws. We do not discriminate on the basis of race, color, national origin, age, disability, sex, sexual orientation, or gender identity.            Thank you!     Thank you for choosing [facility]  for your care. Our goal is always to provide you with excellent care. Hearing back from our patients is one way we can continue to improve our services. Please take a few minutes to complete the written survey that you may receive in the mail after your visit with us. Thank you!             Your Updated Medication List - Protect others around you: Learn how to safely use, store and throw away your medicines at www.disposemymeds.org.          This list is accurate as of 3/2/18  9:49 AM.  Always use your most recent med list.                   Brand Name Dispense Instructions for use Diagnosis    aspirin 81 MG tablet     90 tablet    Take 1 tablet (81 mg) by mouth daily    Hyperlipidemia with target LDL less than 130       atorvastatin 40 MG tablet    LIPITOR    90 tablet    Take 0.5 tablets (20 mg) by  mouth daily    Hyperlipidemia with target LDL less than 130

## 2018-03-14 ENCOUNTER — OFFICE VISIT (OUTPATIENT)
Dept: AUDIOLOGY | Facility: OTHER | Age: 63
End: 2018-03-14
Payer: COMMERCIAL

## 2018-03-14 ENCOUNTER — OFFICE VISIT (OUTPATIENT)
Dept: OTOLARYNGOLOGY | Facility: OTHER | Age: 63
End: 2018-03-14
Payer: COMMERCIAL

## 2018-03-14 VITALS — OXYGEN SATURATION: 98 % | HEART RATE: 62 BPM | BODY MASS INDEX: 24.63 KG/M2 | WEIGHT: 168 LBS

## 2018-03-14 DIAGNOSIS — H93.13 TINNITUS, BILATERAL: Primary | ICD-10-CM

## 2018-03-14 DIAGNOSIS — H90.3 SENSORINEURAL HEARING LOSS, ASYMMETRICAL: Primary | ICD-10-CM

## 2018-03-14 DIAGNOSIS — H93.13 TINNITUS OF BOTH EARS: ICD-10-CM

## 2018-03-14 DIAGNOSIS — H69.92 DISORDER OF LEFT EUSTACHIAN TUBE: ICD-10-CM

## 2018-03-14 DIAGNOSIS — H90.3 BILATERAL SENSORINEURAL HEARING LOSS: ICD-10-CM

## 2018-03-14 PROCEDURE — 99203 OFFICE O/P NEW LOW 30 MIN: CPT | Performed by: OTOLARYNGOLOGY

## 2018-03-14 PROCEDURE — 92567 TYMPANOMETRY: CPT | Performed by: AUDIOLOGIST

## 2018-03-14 PROCEDURE — 92557 COMPREHENSIVE HEARING TEST: CPT | Performed by: AUDIOLOGIST

## 2018-03-14 NOTE — PROGRESS NOTES
ENT Consultation    Evan Rios is a 62 year old male who is seen in consultation at the request of Dr.Danial Alves.      History of Present Illness - Evan Rios is a 62 year old male with tinnitus. He feels he has had ringing in his ears for 40-50 years but the ringing increases about 6-8 months ago. Patient has not had a recent audiogram. Patient was in the [employer] and exposed to loud engine noises.     Past Medical History -   Past Medical History:   Diagnosis Date     Hyperlipidemia LDL goal < 130        Current Medications -   Current Outpatient Prescriptions:      atorvastatin (LIPITOR) 40 MG tablet, Take 0.5 tablets (20 mg) by mouth daily, Disp: 90 tablet, Rfl: 3     aspirin 81 MG tablet, Take 1 tablet (81 mg) by mouth daily, Disp: 90 tablet, Rfl: 3    Allergies - No Known Allergies    Social History -   Social History     Social History     Marital status:      Spouse name: N/A     Number of children: N/A     Years of education: N/A     Social History Main Topics     Smoking status: Never Smoker     Smokeless tobacco: Never Used     Alcohol use Yes     Drug use: No     Sexual activity: Yes     Partners: Female     Other Topics Concern     Parent/Sibling W/ Cabg, Mi Or Angioplasty Before 65f 55m? No     Social History Narrative       Family History -   Family History   Problem Relation Age of Onset     Hypertension Father      Cardiovascular Father 42     heart attack       Review of Systems - As per HPI and PMHx, otherwise review of system review of the head and neck negative.    Physical Exam  Pulse 62  Wt 76.2 kg (168 lb)  SpO2 98%  BMI 24.63 kg/m2  BMI: Body mass index is 24.63 kg/(m^2).    General - The patient is well nourished and well developed, and appears to have good nutritional status.  Alert and oriented to person and place, answers questions and cooperates with examination appropriately.    SKIN - No suspicious lesions or rashes.  Respiration - No respiratory distress.  Head and  Face - Normocephalic and atraumatic, with no gross asymmetry noted of the contour of the facial features.  The facial nerve is intact, with strong symmetric movements.    Voice and Breathing - The patient was breathing comfortably without the use of accessory muscles. The patients voice was clear and strong, and had appropriate pitch and quality.    Ears - Bilateral pinna and EACs with normal appearing overlying skin. Tympanic membrane intact with good mobility on pneumatic otoscopy bilaterally. Bony landmarks of the ossicular chain are normal. The tympanic membranes are normal in appearance. No retraction, perforation, or masses.  No fluid or purulence was seen in the external canal or the middle ear.     Eyes - Extraocular movements intact.  Sclera were not icteric or injected, conjunctiva were pink and moist.    Mouth - Examination of the oral cavity showed pink, healthy oral mucosa. No lesions or ulcerations noted.  The tongue was mobile and midline, and the dentition were in good condition.      Throat - The walls of the oropharynx were smooth, pink, moist, symmetric, and had no lesions or ulcerations.  The tonsillar pillars and soft palate were symmetric.  The uvula was midline on elevation.    Neck - Normal midline excursion of the laryngotracheal complex during swallowing.  Full range of motion on passive movement.  Palpation of the occipital, submental, submandibular, internal jugular chain, and supraclavicular nodes did not demonstrate any abnormal lymph nodes or masses.  The carotid pulse was palpable bilaterally.  Palpation of the thyroid was soft and smooth, with no nodules or goiter appreciated.  The trachea was mobile and midline.    Nose - External contour is symmetric, no gross deflection or scars.  Nasal mucosa is pink and moist with no abnormal mucus.  The septum was midline and non-obstructive, turbinates of normal size and position.  No polyps, masses, or purulence noted on examination.    Neuro  - Nonfocal neuro exam is normal, CN 2 through 12 intact, normal gait and muscle tone.      Performed in clinic today:  Audiologic Studies - An audiogram and tympanogram were performed today as part of the evaluation and personally reviewed. The tympanogram shows Type A curves on the right and Type C curves on the left, with normal canal volumes and middle ear pressures.  The audiogram showed down sloping hearing loss in both ears with normal hearing in the low tones, but moderate to severe hearing loss in the high frequencies.      A/P - Evan Rios is a 62 year old male with sloping hearing loss and tinnitus. I recommended that patient get hearing aids. I suggested that he [facility] for [facility]. I also recommended that patient protect his hearing. He should return for a audiogram in 1 year.       This document serves as a record of the services and decisions personally performed and made by Dr. Kendall Tinoco MD. It was created on his behalf by Danelle Fountain, a trained medical scribe. The creation of this document is based the provider's statements to the medical scribe.  Danelle Fountain 12:09 PM 3/14/2018    Provider:   The information in this document, created by the medical scribe for me, accurately reflects the services I personally performed and the decisions made by me. I have reviewed and approved this document for accuracy prior to leaving the patient care area.  Dr. Kendall Tinoco MD 12:09 PM 3/14/2018    Kendall Tinoco MD.

## 2018-03-14 NOTE — PROGRESS NOTES
AUDIOLOGY REPORT: HEARING EXAM    SUBJECTIVE:  Evan Rios is a 62 year old male referred to audiology from ENT by Dr. Tinoco for a hearing examination. Patient reports longstanding bilateral tinnitus that has become more severe in the right ear over the past 5-6 months. He also reports difficulty hearing in noisy environments, history of barotrauma, and history of noise exposure.    OBJECTIVE:    Otoscopy:   RIGHT: clear ear canal   LEFT:  clear ear canal    Tympanometry:   RIGHT:  normal eardrum mobility   LEFT:   negative pressure     Thresholds:   Pure Tone Thresholds assessed using standard techniques with good  reliability from 250-8000 Hz bilaterally using circumaural headphones.   RIGHT:  moderately severe high frequency sensorineural hearing loss   LEFT:   Profound high frequency sensorineural hearing loss    Speech Reception Threshold:   RIGHT:  15 dB HL   LEFT:    10 dB HL    Word Recognition Score:    RIGHT:  92% at 50 dB HL using NU-6 recorded word list   LEFT:    92% at 50 dB HL using NU-6 recorded word list    ASSESSMENT:  Asymmetric sensorineural hearing loss  Tinnitus of both ears  Disorder of the left eustachian tube    Discussed results with the patient.     PLAN:  Patient was returned to ENT for follow up.     Marc Boogie.  Licensed Audiologist, [place] #[id number]  [facility]  3/14/2018

## 2018-03-14 NOTE — MR AVS SNAPSHOT
After Visit Summary   3/14/2018    Evan Rios    MRN: [id number]           Patient Information     Date Of Birth          1955        Visit Information        Provider Department      3/14/2018 8:30 AM Stewart Zapata AuD [facility]        Today's Diagnoses     Sensorineural hearing loss, asymmetrical    -  1    Tinnitus of both ears        Disorder of left eustachian tube           Follow-ups after your visit        Who to contact     If you have questions or need follow up information about today's clinic visit or your schedule please contact [facility] directly at [phone].  Normal or non-critical lab and imaging results will be communicated to you by Celirohart, letter or phone within 4 business days after the clinic has received the results. If you do not hear from us within 7 days, please contact the clinic through Celirohart or phone. If you have a critical or abnormal lab result, we will notify you by phone as soon as possible.  Submit refill requests through BioActor or call your pharmacy and they will forward the refill request to us. Please allow 3 business days for your refill to be completed.          Additional Information About Your Visit        MyChart Information     BioActor gives you secure access to your electronic health record. If you see a primary care provider, you can also send messages to your care team and make appointments. If you have questions, please call your primary care clinic.  If you do not have a primary care provider, please call [phone] and they will assist you.        Care EveryWhere ID     This is your Care EveryWhere ID. This could be used by other organizations to access your [place] medical records  [id number]         Blood Pressure from Last 3 Encounters:   02/14/18 142/70   01/25/18 122/86   12/22/17 110/78    Weight from Last 3 Encounters:   03/14/18 168 lb (76.2 kg)   12/22/17 168 lb 8 oz (76.4 kg)   11/09/16  162 lb 6.4 oz (73.7 kg)              We Performed the Following     AUDIOGRAM/TYMPANOGRAM - INTERFACE     COMPREHENSIVE HEARING TEST     TYMPANOMETRY        Primary Care Provider Office Phone # Fax #    Tenzin Alves [phone] [address] [place] [address]        Equal Access to Services     [facility]: Hadii aad ku hadasho Soomaali, waaxda luqadaha, qaybta kaalmada adeegyada, waxay idiin hayaan adeeg khreinash la'aan ah. So [facility] [phone].    ATENCIÓN: Si habla español, tiene a taylor disposición servicios gratuitos de asistencia lingüística. [facility] [phone].    We comply with applicable federal civil rights laws and [place] laws. We do not discriminate on the basis of race, color, national origin, age, disability, sex, sexual orientation, or gender identity.            Thank you!     Thank you for choosing [facility]  for your care. Our goal is always to provide you with excellent care. Hearing back from our patients is one way we can continue to improve our services. Please take a few minutes to complete the written survey that you may receive in the mail after your visit with us. Thank you!             Your Updated Medication List - Protect others around you: Learn how to safely use, store and throw away your medicines at www.disposemymeds.org.          This list is accurate as of 3/14/18 11:58 AM.  Always use your most recent med list.                   Brand Name Dispense Instructions for use Diagnosis    aspirin 81 MG tablet     90 tablet    Take 1 tablet (81 mg) by mouth daily    Hyperlipidemia with target LDL less than 130       atorvastatin 40 MG tablet    LIPITOR    90 tablet    Take 0.5 tablets (20 mg) by mouth daily    Hyperlipidemia with target LDL less than 130

## 2018-03-14 NOTE — NURSING NOTE
"Chief Complaint   Patient presents with     Consult     Tinnitus       Initial Pulse 62  Wt 76.2 kg (168 lb)  SpO2 98%  BMI 24.63 kg/m2 Estimated body mass index is 24.63 kg/(m^2) as calculated from the following:    Height as of 12/22/17: 1.759 m (5' 9.25\").    Weight as of this encounter: 76.2 kg (168 lb).  Medication Reconciliation: complete  "

## 2018-03-14 NOTE — MR AVS SNAPSHOT
After Visit Summary   3/14/2018    Evan Rios    MRN: [id number]           Patient Information     Date Of Birth          1955        Visit Information        Provider Department      3/14/2018 8:45 AM Kendall Tinoco MD [facility]        Today's Diagnoses     Tinnitus, bilateral    -  1       Follow-ups after your visit        Additional Services     AUDIOLOGY ADULT REFERRAL                 Who to contact     If you have questions or need follow up information about today's clinic visit or your schedule please contact [facility] directly at [phone].  Normal or non-critical lab and imaging results will be communicated to you by Fligoohart, letter or phone within 4 business days after the clinic has received the results. If you do not hear from us within 7 days, please contact the clinic through Fligoohart or phone. If you have a critical or abnormal lab result, we will notify you by phone as soon as possible.  Submit refill requests through Fangxinmei or call your pharmacy and they will forward the refill request to us. Please allow 3 business days for your refill to be completed.          Additional Information About Your Visit        MyChart Information     Fangxinmei gives you secure access to your electronic health record. If you see a primary care provider, you can also send messages to your care team and make appointments. If you have questions, please call your primary care clinic.  If you do not have a primary care provider, please call [phone] and they will assist you.        Care EveryWhere ID     This is your Care EveryWhere ID. This could be used by other organizations to access your [place] medical records  [id number]        Your Vitals Were     Pulse Pulse Oximetry BMI (Body Mass Index)             62 98% 24.63 kg/m2          Blood Pressure from Last 3 Encounters:   02/14/18 142/70   01/25/18 122/86   12/22/17 110/78    Weight from Last 3  Encounters:   03/14/18 76.2 kg (168 lb)   12/22/17 76.4 kg (168 lb 8 oz)   11/09/16 73.7 kg (162 lb 6.4 oz)              We Performed the Following     AUDIOLOGY ADULT REFERRAL        Primary Care Provider Office Phone # Fax #    Tenzin Viral Alves [phone]       [address] [place] [address]        Equal Access to Services     [facility]: Hadii aad ku hadasho Soomaali, waaxda luqadaha, qaybta kaalmada adeegyada, waxay idiin hayaan adeeg kharash la'aan . So [facility] [phone].    ATENCIÓN: Si habla español, tiene a taylor disposición servicios gratuitos de asistencia lingüística. Llame al [phone].    We comply with applicable federal civil rights laws and [place] laws. We do not discriminate on the basis of race, color, national origin, age, disability, sex, sexual orientation, or gender identity.            Thank you!     Thank you for choosing [facility]  for your care. Our goal is always to provide you with excellent care. Hearing back from our patients is one way we can continue to improve our services. Please take a few minutes to complete the written survey that you may receive in the mail after your visit with us. Thank you!             Your Updated Medication List - Protect others around you: Learn how to safely use, store and throw away your medicines at www.disposemymeds.org.          This list is accurate as of 3/14/18 10:36 AM.  Always use your most recent med list.                   Brand Name Dispense Instructions for use Diagnosis    aspirin 81 MG tablet     90 tablet    Take 1 tablet (81 mg) by mouth daily    Hyperlipidemia with target LDL less than 130       atorvastatin 40 MG tablet    LIPITOR    90 tablet    Take 0.5 tablets (20 mg) by mouth daily    Hyperlipidemia with target LDL less than 130

## 2018-03-26 ENCOUNTER — OFFICE VISIT (OUTPATIENT)
Dept: AUDIOLOGY | Facility: OTHER | Age: 63
End: 2018-03-26
Payer: COMMERCIAL

## 2018-03-26 DIAGNOSIS — H90.3 SENSORINEURAL HEARING LOSS, ASYMMETRICAL: Primary | ICD-10-CM

## 2018-03-26 DIAGNOSIS — H93.13 TINNITUS OF BOTH EARS: ICD-10-CM

## 2018-03-26 PROCEDURE — 92591 HC HEARING AID EXAM BINAURAL: CPT | Performed by: AUDIOLOGIST

## 2018-03-26 NOTE — MR AVS SNAPSHOT
After Visit Summary   3/26/2018    Evan Rios    MRN: [id number]           Patient Information     Date Of Birth          1955        Visit Information        Provider Department      3/26/2018 10:00 AM Stewart Zapata AuD [facility]        Today's Diagnoses     Sensorineural hearing loss, asymmetrical    -  1    Tinnitus of both ears           Follow-ups after your visit        Your next 10 appointments already scheduled     Apr 09, 2018  1:00 PM CDT   Hearing Aid Fitting with [facility]   [facility]    [address] [place] [address] [address]  [facility] [address]   [phone]              Who to contact     If you have questions or need follow up information about today's clinic visit or your schedule please contact [facility] directly at [phone].  Normal or non-critical lab and imaging results will be communicated to you by MyChart, letter or phone within 4 business days after the clinic has received the results. If you do not hear from us within 7 days, please contact the clinic through Quinyx ABhart or phone. If you have a critical or abnormal lab result, we will notify you by phone as soon as possible.  Submit refill requests through Quitbit or call your pharmacy and they will forward the refill request to us. Please allow 3 business days for your refill to be completed.          Additional Information About Your Visit        MyChart Information     Quitbit gives you secure access to your electronic health record. If you see a primary care provider, you can also send messages to your care team and make appointments. If you have questions, please call your primary care clinic.  If you do not have a primary care provider, please call [phone] and they will assist you.        Care EveryWhere ID     This is your Care EveryWhere ID. This could be used by other organizations to access your [facility]  records  [id number]         Blood Pressure from Last 3 Encounters:   02/14/18 142/70   01/25/18 122/86   12/22/17 110/78    Weight from Last 3 Encounters:   03/14/18 168 lb (76.2 kg)   12/22/17 168 lb 8 oz (76.4 kg)   11/09/16 162 lb 6.4 oz (73.7 kg)              We Performed the Following     HEARING AID EXAM BINAURAL        Primary Care Provider Office Phone # Fax #    Tenzin Viral Alves [phone] [address] [place] [address]        Equal Access to Services     [facility]: Hadii aad ku hadasho Soomaali, waaxda luqadaha, qaybta kaalmada adeegyada, waxjeanie rodgers hayaan adedonald cordova . So [facility] [phone].    ATENCIÓN: Si habla español, tiene a taylor disposición servicios gratuitos de asistencia lingüística. [facility] [phone].    We comply with applicable federal civil rights laws and [place] laws. We do not discriminate on the basis of race, color, national origin, age, disability, sex, sexual orientation, or gender identity.            Thank you!     Thank you for choosing [facility]  for your care. Our goal is always to provide you with excellent care. Hearing back from our patients is one way we can continue to improve our services. Please take a few minutes to complete the written survey that you may receive in the mail after your visit with us. Thank you!             Your Updated Medication List - Protect others around you: Learn how to safely use, store and throw away your medicines at www.disposemymeds.org.          This list is accurate as of 3/26/18 12:44 PM.  Always use your most recent med list.                   Brand Name Dispense Instructions for use Diagnosis    aspirin 81 MG tablet     90 tablet    Take 1 tablet (81 mg) by mouth daily    Hyperlipidemia with target LDL less than 130       atorvastatin 40 MG tablet    LIPITOR    90 tablet    Take 0.5 tablets (20 mg) by mouth daily    Hyperlipidemia with target LDL less than 130

## 2018-03-26 NOTE — PROGRESS NOTES
AUDIOLOGY REPORT: HEARING AID EVALUATION    SUBJECTIVE:  Evan Rios is a 62 year old male who was seen in the audiology clinic on 3/26/18 to discuss concerns with hearing and functional communication difficulties. The patient was accompanied by their self. Evan has been seen previously on 3/14/18, and results revealed a bilateral hearing loss. The patient was medically evaluated and determined to be cleared for hearing aids by Dr. Tinoco. Evan notes difficulty with communication in a variety of listening situations, and bilateral tinnitus.    OBJECTIVE:  Patient is a hearing aid candidate. Patient would like to move forward with a hearing aid evaluation today. Therefore, the patient was presented with different options for amplification to help aid in communication. Discussed styles, levels of technology and monaural versus binaural fitting.     The hearing aid(s) mutually chosen were:    EAR(S) TO BE FIT: Binaural  HEARING AID MODEL:  [employer] Linx 3D 9 ALMA (rechargeable)  HEARING AID STYLE: BTE  EARMOLD/TIP/: Size 2, medium power (right and left)  BATTERY SIZE: 312 (Z power)  COLOR: Beige    ASSESSMENT:   Asymmetric sensorineural hearing loss  Tinnitus of both ears    Reviewed purchase information and warranty information with patient. The 45-day trial period was explained to patient. The patient was given a copy of the [place] Department of Health consumer brochure on purchasing hearing instruments. Patient risk factors have been provided to the patient in writing prior to the sale of the hearing aid per FDA regulation. The risk factors are also available in the user instructional booklet to be presented on the day of the hearing aid fitting. Hearing aid evaluation completed and hearing aid(s) ordered.    PLAN:  Evan is scheduled to return in 2-3 weeks for a hearing aid fitting and programming. Purchase agreement will be completed on that date.    Please contact this clinic with any  questions or concerns.    Marc Boogie.  Licensed [place]ologist, [place] #[id number]  [facility]  3/26/2018

## 2018-04-09 ENCOUNTER — OFFICE VISIT (OUTPATIENT)
Dept: AUDIOLOGY | Facility: OTHER | Age: 63
End: 2018-04-09
Payer: COMMERCIAL

## 2018-04-09 DIAGNOSIS — H90.3 SENSORINEURAL HEARING LOSS, ASYMMETRICAL: Primary | ICD-10-CM

## 2018-04-09 DIAGNOSIS — H93.13 TINNITUS OF BOTH EARS: ICD-10-CM

## 2018-04-09 PROCEDURE — 99207 ZZC NO CHARGE LOS: CPT | Performed by: AUDIOLOGIST

## 2018-04-09 PROCEDURE — V5261 HEARING AID, DIGIT, BIN, BTE: HCPCS | Performed by: AUDIOLOGIST

## 2018-04-09 PROCEDURE — 92593 HC HEARING AID CHECK, BINAURAL: CPT | Performed by: AUDIOLOGIST

## 2018-04-09 PROCEDURE — V5011 HEARING AID FITTING/CHECKING: HCPCS | Mod: RT | Performed by: AUDIOLOGIST

## 2018-04-09 PROCEDURE — V5267 HEARING AID SUP/ACCESS/DEV: HCPCS | Performed by: AUDIOLOGIST

## 2018-04-09 PROCEDURE — V5020 CONFORMITY EVALUATION: HCPCS | Mod: RT | Performed by: AUDIOLOGIST

## 2018-04-09 PROCEDURE — V5160 DISPENSING FEE BINAURAL: HCPCS | Performed by: AUDIOLOGIST

## 2018-04-09 NOTE — MR AVS SNAPSHOT
After Visit Summary   4/9/2018    Evan Rios    MRN: [id number]           Patient Information     Date Of Birth          1955        Visit Information        Provider Department      4/9/2018 1:00 PM Stewart Zapata AuD [facility]        Today's Diagnoses     Sensorineural hearing loss, asymmetrical    -  1    Tinnitus of both ears           Follow-ups after your visit        Your next 10 appointments already scheduled     Apr 30, 2018 11:00 AM CDT   Return Visit with Aliza Hoffman   [facility] ([facility]    [address] [place] [address] [address]  [facility] [address]   [phone]              Who to contact     If you have questions or need follow up information about today's clinic visit or your schedule please contact [facility] directly at [phone].  Normal or non-critical lab and imaging results will be communicated to you by MyChart, letter or phone within 4 business days after the clinic has received the results. If you do not hear from us within 7 days, please contact the clinic through MyChart or phone. If you have a critical or abnormal lab result, we will notify you by phone as soon as possible.  Submit refill requests through [employer] or call your pharmacy and they will forward the refill request to us. Please allow 3 business days for your refill to be completed.          Additional Information About Your Visit        MyChart Information     [employer] gives you secure access to your electronic health record. If you see a primary care provider, you can also send messages to your care team and make appointments. If you have questions, please call your primary care clinic.  If you do not have a primary care provider, please call [phone] and they will assist you.        Care EveryWhere ID     This is your Care EveryWhere ID. This could be used by other organizations to access your [facility]  records  [id number]         Blood Pressure from Last 3 Encounters:   02/14/18 142/70   01/25/18 122/86   12/22/17 110/78    Weight from Last 3 Encounters:   03/14/18 168 lb (76.2 kg)   12/22/17 168 lb 8 oz (76.4 kg)   11/09/16 162 lb 6.4 oz (73.7 kg)              We Performed the Following     DISPENSING FEE, BINAURAL HEARING AID     HEARING AID BTE DIGITAL, BINAURAL     HEARING AID CHECK, BINAURAL     HEARING AID CONFORMITY EVALUATION     HEARING AID FIT/ORIENTATION/CHECK     HEARING AID SUPPLY        Primary Care Provider Office Phone # Fax #    Tenzin Viral Alves [phone] [address] [place] [address]        Equal Access to Services     [facility]: Hadii jaky caballero hadasho Soomaali, waaxda luqadaha, qaybta kaalmada adeegyada, jeannette pruittin hayledy cordova . So [facility] [phone].    ATENCIÓN: Si habla español, tiene a taylor disposición servicios gratuitos de asistencia lingüística. [facility] [phone].    We comply with applicable federal civil rights laws and [place] laws. We do not discriminate on the basis of race, color, national origin, age, disability, sex, sexual orientation, or gender identity.            Thank you!     Thank you for choosing [facility]  for your care. Our goal is always to provide you with excellent care. Hearing back from our patients is one way we can continue to improve our services. Please take a few minutes to complete the written survey that you may receive in the mail after your visit with us. Thank you!             Your Updated Medication List - Protect others around you: Learn how to safely use, store and throw away your medicines at www.disposemymeds.org.          This list is accurate as of 4/9/18  4:01 PM.  Always use your most recent med list.                   Brand Name Dispense Instructions for use Diagnosis    aspirin 81 MG tablet     90 tablet    Take 1 tablet (81 mg) by mouth daily    Hyperlipidemia with  target LDL less than 130       atorvastatin 40 MG tablet    LIPITOR    90 tablet    Take 0.5 tablets (20 mg) by mouth daily    Hyperlipidemia with target LDL less than 130

## 2018-04-09 NOTE — PROGRESS NOTES
AUDIOLOGY REPORT: HEARING AID FITTING    SUBJECTIVE:  Evan Rios is a 62 year old male who was seen in the audiology clinic today for a binaural hearing aid fitting. Previous results have revealed a bilateral hearing loss. The patient was given medical clearance to pursue amplification by Dr. Tinoco.    OBJECTIVE:  Prior to fitting, a hearing aid check was performed to ensure device functionality.The hearing aid conformity evaluation was completed.The hearing aids were placed and they provided a good fit. Real-ear-probe-microphone measurements were completed on the [employer] system and were a good match to NAL-NL2 target with soft sounds audible, moderate sounds comfortable, and loud sounds below discomfort. UCLs are verified through maximum power output measures and demonstrate appropriate limiting of loud inputs. The following adjustments were made per patient request for comfort; created Outdoors program with overall gain decreased 1x3 steps. Evan reported good volume and sound quality today.    Evan was oriented to proper hearing aid use, care, cleaning (no water, dry brush), batteries (size 312, insertion/removal, toxicity, low-battery signal), aid insertion/removal, user booklet, warranty information, storage cases, and other hearing aid details. The patient confirmed understanding of hearing aid use and care, and showed proper insertion of hearing aid and batteries while in the office today.     Hearing aids were programmed as follows:  Program 1:All-around  Program 2:Outdoors    EAR(S) FIT: Binaural  HEARING AID MODEL:  ReSound Linx 3D 9 Z power  HEARING AID STYLE: BTE/MAGDA  EARMOLD/TIP/: Size 2, medium power (right and left)  BATTERY SIZE: 312  SERIAL NUMBERS: Right: [id number]; Left: [id number]  WARRANTY END DATE: 4/26/21    ASSESSMENT:  Asymmetric sensorineural hearing loss  Tinnitus of both ears    Binaural hearing aid(s) were fit today. Verification measures were performed. Evan  signed the hearing aid purchase agreement and was given a copy, as well as details on their hearing aids.    PLAN:  Evan will return for follow-up in 2-3 weeks for a hearing aid review appointment.    Please call this clinic with questions regarding today s appointment.    CHARGES:   [address]349 Binaural Hearing Aid Check ($81)    Binaural Dispensing Fee ($500)    (X2) Binaural Fit/Orientation ($322)    (X2) Binaural HA Conformity Eval ($174)    (BTE) Binaural Hearing Aids ($5523)    Hearing Aid Supply (Z power ) ($200)   Total: $6800.     Please bill to patient's insurance before billing patient directly.    Marc Boogie.  Licensed [place]ologist, [place] #[id number]  [facility]  4/9/2018

## 2018-04-30 ENCOUNTER — OFFICE VISIT (OUTPATIENT)
Dept: AUDIOLOGY | Facility: OTHER | Age: 63
End: 2018-04-30
Payer: COMMERCIAL

## 2018-04-30 DIAGNOSIS — H90.3 SENSORINEURAL HEARING LOSS, ASYMMETRICAL: Primary | ICD-10-CM

## 2018-04-30 DIAGNOSIS — H93.13 TINNITUS OF BOTH EARS: ICD-10-CM

## 2018-04-30 PROCEDURE — V5299 HEARING SERVICE: HCPCS | Performed by: AUDIOLOGIST

## 2018-04-30 PROCEDURE — 99207 ZZC NO CHARGE LOS: CPT | Performed by: AUDIOLOGIST

## 2018-04-30 NOTE — PROGRESS NOTES
AUDIOLOGY REPORT: HEARING AID CHECK    SUBJECTIVE:  Evan Rios is a 62 year old male who was seen in the audiology clinic for a hearing aid check. The patient reports significant benefit from hearing aids including minor relief from tinnitus while using his devices.     OBJECTIVE:    Otoscopy:   RIGHT:  clear ear canal   LEFT:   clear ear canal     Connected hearing aids to [employer]; data logging showed average wear time of 12+ hours/day. He also reported more than full day of use with full charge.    Reviewed hearing aid chente features, realistic expectations, daily maintenance and cleaning, and proper insertion.    ASSESSMENT:  Asymmetric sensorineural hearing loss  Tinnitus of both ears    Patient satisfied with current hearing aid performance.    PLAN:  It is recommended that the patient return for hearing aid check in 2-3 weeks.    Please call this clinic with questions regarding these results or recommendations.    Marc Boogie.  [place]ensed [place]ologist, [place] #[id number]  [facility]  4/30/2018

## 2018-04-30 NOTE — MR AVS SNAPSHOT
After Visit Summary   4/30/2018    Evan Rios    MRN: [id number]           Patient Information     Date Of Birth          1955        Visit Information        Provider Department      4/30/2018 11:00 AM Stewart Zapata AuD [facility]        Today's Diagnoses     Sensorineural hearing loss, asymmetrical    -  1    Tinnitus of both ears           Follow-ups after your visit        Your next 10 appointments already scheduled     May 21, 2018 10:00 AM CDT   [facility] Visit with Aliza Hoffman   [facility]    [address] [place] [address] [address]  [facility] [address]   [phone]              Who to contact     If you have questions or need follow up information about today's clinic visit or your schedule please contact [facility] directly at [phone].  Normal or non-critical lab and imaging results will be communicated to you by MyChart, letter or phone within 4 business days after the clinic has received the results. If you do not hear from us within 7 days, please contact the clinic through MyChart or phone. If you have a critical or abnormal lab result, we will notify you by phone as soon as possible.  Submit refill requests through [employer] or call your pharmacy and they will forward the refill request to us. Please allow 3 business days for your refill to be completed.          Additional Information About Your Visit        MyChart Information     [employer] gives you secure access to your electronic health record. If you see a primary care provider, you can also send messages to your care team and make appointments. If you have questions, please call your primary care clinic.  If you do not have a primary care provider, please call [phone] and they will assist you.        Care EveryWhere ID     This is your Care EveryWhere ID. This could be used by other organizations to access your [facility]  records  [id number]         Blood Pressure from Last 3 Encounters:   02/14/18 142/70   01/25/18 122/86   12/22/17 110/78    Weight from Last 3 Encounters:   03/14/18 168 lb (76.2 kg)   12/22/17 168 lb 8 oz (76.4 kg)   11/09/16 162 lb 6.4 oz (73.7 kg)              We Performed the Following     HEARING AID CHECK/NO CHARGE        Primary Care Provider Office Phone # Fax #    Tenzin Alves [phone] [address] [place] [address]        Equal Access to Services     [facility]: Hadii aad ku hadasho Soomaali, waaxda luqadaha, qaybta kaalmada adeegyada, jeannette cordova . So [facility] [phone].    ATENCIÓN: Si habla español, tiene a taylor disposición servicios gratuitos de asistencia lingüística. [facility] [phone].    We comply with applicable federal civil rights laws and [place] laws. We do not discriminate on the basis of race, color, national origin, age, disability, sex, sexual orientation, or gender identity.            Thank you!     Thank you for choosing [facility]  for your care. Our goal is always to provide you with excellent care. Hearing back from our patients is one way we can continue to improve our services. Please take a few minutes to complete the written survey that you may receive in the mail after your visit with us. Thank you!             Your Updated Medication List - Protect others around you: Learn how to safely use, store and throw away your medicines at www.disposemymeds.org.          This list is accurate as of 4/30/18  5:10 PM.  Always use your most recent med list.                   Brand Name Dispense Instructions for use Diagnosis    aspirin 81 MG tablet     90 tablet    Take 1 tablet (81 mg) by mouth daily    Hyperlipidemia with target LDL less than 130       atorvastatin 40 MG tablet    LIPITOR    90 tablet    Take 0.5 tablets (20 mg) by mouth daily    Hyperlipidemia with target LDL less than 130

## 2018-05-21 ENCOUNTER — OFFICE VISIT (OUTPATIENT)
Dept: AUDIOLOGY | Facility: OTHER | Age: 63
End: 2018-05-21
Payer: COMMERCIAL

## 2018-05-21 DIAGNOSIS — H90.3 SENSORINEURAL HEARING LOSS, ASYMMETRICAL: Primary | ICD-10-CM

## 2018-05-21 DIAGNOSIS — H93.13 TINNITUS OF BOTH EARS: ICD-10-CM

## 2018-05-21 PROCEDURE — V5299 HEARING SERVICE: HCPCS | Performed by: AUDIOLOGIST

## 2018-05-21 PROCEDURE — 99207 ZZC NO CHARGE LOS: CPT | Performed by: AUDIOLOGIST

## 2018-05-21 NOTE — MR AVS SNAPSHOT
After Visit Summary   5/21/2018    Evan Rios    MRN: [id number]           Patient Information     Date Of Birth          1955        Visit Information        Provider Department      5/21/2018 10:00 AM Stewart Zapata AuD [facility]        Today's Diagnoses     Sensorineural hearing loss, asymmetrical    -  1    Tinnitus of both ears           Follow-ups after your visit        Your next 10 appointments already scheduled     Nov 19, 2018 10:00 AM CST   Return Visit with Aliza Hoffman   [facility]    [address] [place] [address] [address]  [facility] [address] [phone]              Who to contact     If you have questions or need follow up information about today's clinic visit or your schedule please contact [facility] directly at [phone].  Normal or non-critical lab and imaging results will be communicated to you by MyChart, letter or phone within 4 business days after the clinic has received the results. If you do not hear from us within 7 days, please contact the clinic through MyChart or phone. If you have a critical or abnormal lab result, we will notify you by phone as soon as possible.  Submit refill requests through [employer] or call your pharmacy and they will forward the refill request to us. Please allow 3 business days for your refill to be completed.          Additional Information About Your Visit        MyChart Information     [employer] gives you secure access to your electronic health record. If you see a primary care provider, you can also send messages to your care team and make appointments. If you have questions, please call your primary care clinic.  If you do not have a primary care provider, please call [phone] and they will assist you.        Care EveryWhere ID     This is your Care EveryWhere ID. This could be used by other organizations to access your [facility]  records  [id number]         Blood Pressure from Last 3 Encounters:   02/14/18 142/70   01/25/18 122/86   12/22/17 110/78    Weight from Last 3 Encounters:   03/14/18 168 lb (76.2 kg)   12/22/17 168 lb 8 oz (76.4 kg)   11/09/16 162 lb 6.4 oz (73.7 kg)              We Performed the Following     HEARING AID CHECK/NO CHARGE        Primary Care Provider Office Phone # Fax #    Tenzin Viral Alves [phone] [address] [place] [address]        Equal Access to Services     [facility]: Hadii aad ku hadasho Soomaali, waaxda luqadaha, qaybta kaalmada adeegyada, jeannette cordova . So [facility] [phone].    ATENCIÓN: Si habla español, tiene a taylor disposición servicios gratuitos de asistencia lingüística. [facility] [phone].    We comply with applicable federal civil rights laws and [place] laws. We do not discriminate on the basis of race, color, national origin, age, disability, sex, sexual orientation, or gender identity.            Thank you!     Thank you for choosing [facility]  for your care. Our goal is always to provide you with excellent care. Hearing back from our patients is one way we can continue to improve our services. Please take a few minutes to complete the written survey that you may receive in the mail after your visit with us. Thank you!             Your Updated Medication List - Protect others around you: Learn how to safely use, store and throw away your medicines at www.disposemymeds.org.          This list is accurate as of 5/21/18  3:09 PM.  Always use your most recent med list.                   Brand Name Dispense Instructions for use Diagnosis    aspirin 81 MG tablet     90 tablet    Take 1 tablet (81 mg) by mouth daily    Hyperlipidemia with target LDL less than 130       atorvastatin 40 MG tablet    LIPITOR    90 tablet    Take 0.5 tablets (20 mg) by mouth daily    Hyperlipidemia with target LDL less than 130

## 2018-05-21 NOTE — PROGRESS NOTES
AUDIOLOGY REPORT: HEARING AID CHECK    SUBJECTIVE:  Evan Rios is a 63 year old male who was seen in the audiology clinic for a hearing aid check. The patient reports significant benefit from his hearing aids.     OBJECTIVE:    Otoscopy:   RIGHT:  clear ear canal   LEFT:   clear ear canal     Cleaned debris from receivers of both hearing aids and gave patient extra pair of medium open domes. Suggested patient purchase [employer] for daily cleaning.    Discussed long term maintenance and realistic expectations.    ASSESSMENT:  Asymmetric sensorineural hearing loss  Tinnitus of both ears    Patient satisfied with current hearing aid performance.    PLAN:  It is recommended that the patient return for hearing aid check in 6 months.    Please call this clinic with questions regarding these results or recommendations.    Marc Boogie.  [place]ensed [place]ologist, [place] #[id number]  [facility]  5/21/2018

## 2018-06-13 ENCOUNTER — TELEPHONE (OUTPATIENT)
Dept: AUDIOLOGY | Facility: OTHER | Age: 63
End: 2018-06-13

## 2018-06-13 NOTE — TELEPHONE ENCOUNTER
Returned patient's call regarding hearing aid benefits after following up with his insurance carrier.    Claims from hearing aid fitting on 4/9/18 were denied completely by patient's insurance company citing that codes are not covered in his plan despite two calls (one by me [reference #[id number] with Erika] and one by patient) that estimated significant coverage/benefits by insurance carrier.    In follow-up with insurance company (reference #[id number] with Kisha) it was first stated that services were not covered, then stated that they were covered under category of medical equipment, then stated that patient is not eligible for benefits due to age limits. Conversation details were relayed to patient who is personally investigating denial of claim/services with his insurance carrier.    It was also stated by  that one code, 29088, cannot be billed on the same day as other hearing aid fitting codes (, , , and hearing aid V code) and patient is not responsible for that amount ($81).    Marc Boogie.  Licensed [place]ologist, [place] #[id number]  [facility]  6/13/2018

## 2018-08-17 ENCOUNTER — HOSPITAL ENCOUNTER (OUTPATIENT)
Dept: ULTRASOUND IMAGING | Facility: CLINIC | Age: 63
Discharge: HOME | End: 2018-08-17
Attending: UROLOGY | Admitting: UROLOGY
Payer: COMMERCIAL

## 2018-08-17 DIAGNOSIS — N28.1 RENAL CYST: ICD-10-CM

## 2018-08-17 PROCEDURE — 76770 US EXAM ABDO BACK WALL COMP: CPT

## 2018-11-19 ENCOUNTER — OFFICE VISIT (OUTPATIENT)
Dept: AUDIOLOGY | Facility: OTHER | Age: 63
End: 2018-11-19
Payer: COMMERCIAL

## 2018-11-19 DIAGNOSIS — H90.3 SENSORINEURAL HEARING LOSS, BILATERAL: Primary | ICD-10-CM

## 2018-11-19 PROCEDURE — V5299 HEARING SERVICE: HCPCS | Performed by: AUDIOLOGIST

## 2018-11-19 PROCEDURE — 99207 ZZC NO CHARGE LOS: CPT | Performed by: AUDIOLOGIST

## 2018-11-19 NOTE — MR AVS SNAPSHOT
After Visit Summary   11/19/2018    Evan Rios    MRN: [id number]           Patient Information     Date Of Birth          1955        Visit Information        Provider Department      11/19/2018 10:00 AM John Coburn AuD [facility]        Today's Diagnoses     Sensorineural hearing loss, bilateral    -  1       Follow-ups after your visit        Your next 10 appointments already scheduled     Mar 25, 2019  9:00 AM CDT   Return Visit with Aliza Bhagat   [facility]    [address] [facility] [address]  [facility] [address] [phone]              Who to contact     If you have questions or need follow up information about today's clinic visit or your schedule please contact [facility] directly at [phone].  Normal or non-critical lab and imaging results will be communicated to you by MyChart, letter or phone within 4 business days after the clinic has received the results. If you do not hear from us within 7 days, please contact the clinic through [employer] or phone. If you have a critical or abnormal lab result, we will notify you by phone as soon as possible.  Submit refill requests through [employer] or call your pharmacy and they will forward the refill request to us. Please allow 3 business days for your refill to be completed.          Additional Information About Your Visit        MyChart Information     [employer] gives you secure access to your electronic health record. If you see a primary care provider, you can also send messages to your care team and make appointments. If you have questions, please call your primary care clinic.  If you do not have a primary care provider, please call [phone] and they will assist you.        Care EveryWhere ID     This is your Care EveryWhere ID. This could be used by other organizations to access your [facility] medical records  [id number]         Blood Pressure  from Last 3 Encounters:   02/14/18 142/70   01/25/18 122/86   12/22/17 110/78    Weight from Last 3 Encounters:   03/14/18 168 lb (76.2 kg)   12/22/17 168 lb 8 oz (76.4 kg)   11/09/16 162 lb 6.4 oz (73.7 kg)              We Performed the Following     HEARING AID CHECK/NO CHARGE        Primary Care Provider Office Phone # Fax #    Tenzin Alves [phone] [address] [place] [address]        Equal Access to Services     [facility]: Hadii aad ku hadasho Soomaali, waaxda luqadaha, qaybta kaalmada adeegyada, jeannette cordova . So [facility] [phone].    ATENCIÓN: Si habla español, tiene a taylor disposición servicios gratuitos de asistencia lingüística. [facility] [phone].    We comply with applicable federal civil rights laws and [place] laws. We do not discriminate on the basis of race, color, national origin, age, disability, sex, sexual orientation, or gender identity.            Thank you!     Thank you for choosing [facility]  for your care. Our goal is always to provide you with excellent care. Hearing back from our patients is one way we can continue to improve our services. Please take a few minutes to complete the written survey that you may receive in the mail after your visit with us. Thank you!             Your Updated Medication List - Protect others around you: Learn how to safely use, store and throw away your medicines at www.disposemymeds.org.          This list is accurate as of 11/19/18 10:08 AM.  Always use your most recent med list.                   Brand Name Dispense Instructions for use Diagnosis    aspirin 81 MG tablet     90 tablet    Take 1 tablet (81 mg) by mouth daily    Hyperlipidemia with target LDL less than 130       atorvastatin 40 MG tablet    LIPITOR    90 tablet    Take 0.5 tablets (20 mg) by mouth daily    Hyperlipidemia with target LDL less than 130

## 2018-11-19 NOTE — PROGRESS NOTES
HEARING AID RECHECK    Patient Name:  Evan Rios    Patient Age:   63 year old    :  1955    Background:   Patient was fit with binaural Resound Linx 3D 9 Zpower MAGDA aids on 18 by Aliza Herrera.  Patient is very satisfied with the aids and reports excellent benefit.  He reported that yesterday the left aid battery  early in the day (he normally gets 15 hours).    Procedures:   Went over cleaning procedure and warranty information with patient.    Plan:   Patient will come back in April and will call if he has battery life issues before then.  Will come in as needed for service.    NO CHARGE VISIT    Jose Coburn MA, CCC-A  [place] Licensed Audiologist #[id number]  2018

## 2019-01-30 NOTE — PATIENT INSTRUCTIONS
We'll let you know your lab results as soon as we can.     If your Raynaud's gets worse or you have other symptoms, let me know.    If your spots recur, then we should probably excise them.    Contact us or return if questions or concerns.     Have a nice day!    Dr. Alves       Preventive Health Recommendations  Male Ages 50 - 64    Yearly exam:             See your health care provider every year in order to  o   Review health changes.   o   Discuss preventive care.    o   Review your medicines if your doctor has prescribed any.     Have a cholesterol test every 5 years, or more frequently if you are at risk for high cholesterol/heart disease.     Have a diabetes test (fasting glucose) every three years. If you are at risk for diabetes, you should have this test more often.     Have a colonoscopy at age 50, or have a yearly FIT test (stool test). These exams will check for colon cancer.      Talk with your health care provider about whether or not a prostate cancer screening test (PSA) is right for you.    You should be tested each year for STDs (sexually transmitted diseases), if you re at risk.     Shots: Get a flu shot each year. Get a tetanus shot every 10 years.     Nutrition:    Eat at least 5 servings of fruits and vegetables daily.     Eat whole-grain bread, whole-wheat pasta and brown rice instead of white grains and rice.     Get adequate Calcium and Vitamin D.     Lifestyle    Exercise for at least 150 minutes a week (30 minutes a day, 5 days a week). This will help you control your weight and prevent disease.     Limit alcohol to one drink per day.     No smoking.     Wear sunscreen to prevent skin cancer.     See your dentist every six months for an exam and cleaning.     See your eye doctor every 1 to 2 years.

## 2019-01-30 NOTE — PROGRESS NOTES
SUBJECTIVE:   CC: Evan Rios is an 63 year old male who presents for preventative health visit.     Physical   Annual:     Getting at least 3 servings of Calcium per day:  Yes    Bi-annual eye exam:  Yes    Dental care twice a year:  Yes    Sleep apnea or symptoms of sleep apnea:  None    Diet:  Regular (no restrictions)    Frequency of exercise:  6-7 days/week    Duration of exercise:  15-30 minutes    Taking medications regularly:  Yes    Medication side effects:  None    Additional concerns today:  No    PHQ-2 Total Score: 0        WART(S)  Onset: 2-3 months     Description:   Location: back of neck   Number of warts: 1  Painful: no    Accompanying Signs & Symptoms:  Signs of infection: no    History:   History of trauma: no  Prior warts: YES    Therapies Tried and outcome: OTC meds  No treatment other than compound W and OVER-THE-COUNTER freezing.        Patient would also like to talk about circulation in his hands. When he wakes up in the morning, his right index finger is stiff, white when it's cold.  He thinks it's a circulation issue.      Today's PHQ-2 Score:   PHQ-2 ( 1999 Pfizer) 2/6/2019   Q1: Little interest or pleasure in doing things 0   Q2: Feeling down, depressed or hopeless 0   PHQ-2 Score 0   Q1: Little interest or pleasure in doing things Not at all   Q2: Feeling down, depressed or hopeless Not at all   PHQ-2 Score 0       Abuse: Current or Past(Physical, Sexual or Emotional)- No  Do you feel safe in your environment? Yes    Social History     Tobacco Use     Smoking status: Never Smoker     Smokeless tobacco: Never Used   Substance Use Topics     Alcohol use: Yes     Alcohol Use 2/6/2019   If you drink alcohol do you typically have greater than 3 drinks per day OR greater than 7 drinks per week? No   No flowsheet data found.    Last PSA:   PSA   Date Value Ref Range Status   01/25/2018 1.86 0 - 4 ug/L Final     Comment:     Assay Method:  Chemiluminescence using Siemens Vista analyzer        Reviewed orders with patient. Reviewed health maintenance and updated orders accordingly - Yes  BP Readings from Last 3 Encounters:   02/06/19 128/72   02/14/18 142/70   01/25/18 122/86    Wt Readings from Last 3 Encounters:   02/06/19 77.1 kg (170 lb)   03/14/18 76.2 kg (168 lb)   12/22/17 76.4 kg (168 lb 8 oz)                  Patient Active Problem List   Diagnosis     Hyperlipidemia with target LDL less than 130     Vertigo     Ganglion cyst     AK (actinic keratosis)     Raynaud's phenomenon without gangrene     Past Surgical History:   Procedure Laterality Date     HERNIA REPAIR         Social History     Tobacco Use     Smoking status: Never Smoker     Smokeless tobacco: Never Used   Substance Use Topics     Alcohol use: Yes     Family History   Problem Relation Age of Onset     Hypertension Father      Cardiovascular Father 42        heart attack         Current Outpatient Medications   Medication Sig Dispense Refill     aspirin 81 MG tablet Take 1 tablet (81 mg) by mouth daily 90 tablet 3     atorvastatin (LIPITOR) 40 MG tablet Take 0.5 tablets (20 mg) by mouth daily 90 tablet 3     No Known Allergies  Recent Labs   Lab Test 12/22/17  1026 11/09/16  1421 10/09/15  1059 02/21/14  0904  02/27/13 01/06/12   A1C  --   --   --   --   --  6.0 5.8   LDL 74 84 71 82  --  72 92   HDL 51 58 58 51  --  46 48   TRIG 84 79 72 86  --  84 113   ALT  --  25 18 27  --  18 17   CR  --  1.25 0.89 1.13  --   --  1.09   GFRESTIMATED  --  59* 87 67   < >  --   --    GFRESTBLACK  --  71 >90   GFR Calc   81   < >  --   --    POTASSIUM  --  4.3 3.7 4.3  --   --  3.9   TSH  --  1.49  --   --   --   --  1.533    < > = values in this interval not displayed.        Reviewed and updated as needed this visit by clinical staff  Tobacco  Allergies  Meds  Med Hx  Surg Hx  Fam Hx  Soc Hx        Reviewed and updated as needed this visit by Provider            Review of Systems   Constitutional: Negative for chills  "and fever.   HENT: Negative for congestion, ear pain, hearing loss and sore throat.    Eyes: Negative for pain and visual disturbance.   Respiratory: Negative for cough and shortness of breath.    Cardiovascular: Negative for chest pain, palpitations and peripheral edema.   Gastrointestinal: Negative for abdominal pain, constipation, diarrhea, heartburn, hematochezia and nausea.   Genitourinary: Negative for discharge, dysuria, frequency, genital sores, hematuria, impotence and urgency.   Musculoskeletal: Negative for arthralgias, joint swelling and myalgias.   Skin: Negative for rash.   Neurological: Negative for dizziness, weakness, headaches and paresthesias.   Psychiatric/Behavioral: Negative for mood changes. The patient is not nervous/anxious.          OBJECTIVE:   /72   Pulse 68   Temp 97.3  F (36.3  C) (Temporal)   Resp 16   Ht 1.772 m (5' 9.78\")   Wt 77.1 kg (170 lb)   BMI 24.54 kg/m      Physical Exam  GENERAL: healthy, alert and no distress  EYES: Eyes grossly normal to inspection, PERRL and conjunctivae and sclerae normal  HENT: ear canals and TM's normal, nose and mouth without ulcers or lesions  NECK: no adenopathy, no asymmetry, masses, or scars and thyroid normal to palpation  RESP: lungs clear to auscultation - no rales, rhonchi or wheezes  CV: regular rate and rhythm, normal S1 S2, no S3 or S4, no murmur, click or rub, no peripheral edema and peripheral pulses strong  ABDOMEN: soft, nontender, no hepatosplenomegaly, no masses and bowel sounds normal  MS: no gross musculoskeletal defects noted, no edema  SKIN: papule c/w wart on back of left occiput, small AK on face above left eyelid  NEURO: Normal strength and tone, mentation intact and speech normal  PSYCH: mentation appears normal, affect normal/bright    Diagnostic Test Results:  Results for orders placed or performed during the hospital encounter of 08/17/18   US Renal Complete    Narrative    US RENAL COMPLETE 8/17/2018 8:13 " AM    HISTORY:  Renal cysts.     COMPARISON: None    FINDINGS: The right kidney measures 12.1 x 6.0 x 5.6 cm  with a  cortical thickness of 1.4  cm.  The left kidney measures 12.0 x 4.7 x  6.0  cm with a cortical thickness of 1.7  cm.     There are 3 right kidney cysts, as before. Compared to prior  examination, these are similar in size, measuring 1.9 x 1.4 x 1.5 cm,  1.1 x 1.1 x 0.7 cm, and 2.4 x 1.9 x 1.6 cm. Previously, these measured  1.8 x 1.5 x 1.5 cm, 0.9 x 0.8 x 0.9 cm, and 2.1 x 1.5 x 2.1 cm,  respectively.    The previously noted left renal cyst appears slightly increased in  size on today's exam, measuring 4.4 x 3.5 x 4.9 cm, previously 3.6 x  3.3 x 3.7 cm.    The bladder is partly  distended and shows no gross abnormality.      Impression    IMPRESSION:   Follow-up of bilateral renal cysts, as above.     ADOFLO MEDEIROS MD       ASSESSMENT/PLAN:   1. Preventative health care  Reviewed recommended screenings and ordered appropriate testing for pt's risks and per pt's request(s).   - Lipid panel reflex to direct LDL Non-fasting  - Comprehensive metabolic panel    2. Hyperlipidemia with target LDL less than 130  Currently Controlled.  Continue current regimen.  Check labs.  Call/return if any problems or questions arise.   - aspirin (ASA) 81 MG tablet; Take 1 tablet (81 mg) by mouth daily  Dispense: 90 tablet; Refill: 3  - atorvastatin (LIPITOR) 40 MG tablet; Take 0.5 tablets (20 mg) by mouth daily  Dispense: 90 tablet; Refill: 3  - Lipid panel reflex to direct LDL Non-fasting  - Comprehensive metabolic panel    3. Other viral warts  Briefly discussed nature of these and recommendation to have this frozen.  After informed consent, the lesion was frozen with 2 cycles of liquid nitrogen.  Patient tolerated procedure well.  If this recurs, would have a low threshold for excision given its atypical location and slightly atypical appearance.   - DESTRUCT BENIGN LESION, UP TO 14    4. AK (actinic keratosis)    "Improved after previous treatment.  Will treat 1 more time with cryotherapy.  Patient tolerated 2 cycles of liquid nitrogen well.  - DESTRUCT PREMALIGNANT LESION, FIRST    5. Raynaud's phenomenon without gangrene  Discussed nature of this, typical conservative treatment.  Patient will continue with this for now.  He will let me know if any worsening or if he feels things are progressing to the point he needs medication.    6. Need for prophylactic vaccination and inoculation against influenza  Immunized.  - FLU VACCINE, (RIV4) RECOMBINANT HA  , IM (FluBlok, egg free) [00066]- >18 YRS (FMG recommended  50-64 YRS)  - Vaccine Administration, Initial [77301]    Portions of this note were completed using Dragon dictation software.  Although reviewed, there may be typographical and other inadvertent errors that remain.         COUNSELING:   Reviewed preventive health counseling, as reflected in patient instructions       Regular exercise       Healthy diet/nutrition       Aspirin Prophylaxsis       HIV screeninx in teen years, 1x in adult years, and at intervals if high risk       Prostate cancer screening       Osteoporosis Prevention/Bone Health       The 10-year ASCVD risk score ([place] LYONS Jr., et al., 2013) is: 7.5%    Values used to calculate the score:      Age: 63 years      Sex: Male      Is Non- : No      Diabetic: No      Tobacco smoker: No      Systolic Blood Pressure: 128 mmHg      Is BP treated: No      HDL Cholesterol: 51 mg/dL      Total Cholesterol: 142 mg/dL    BP Readings from Last 1 Encounters:   19 128/72     Estimated body mass index is 24.54 kg/m  as calculated from the following:    Height as of this encounter: 1.772 m (5' 9.78\").    Weight as of this encounter: 77.1 kg (170 lb).           reports that  has never smoked. he has never used smokeless tobacco.      Counseling Resources:  ATP IV Guidelines  Pooled Cohorts Equation Calculator  FRAX Risk Assessment  ICSI " Preventive Guidelines  Dietary Guidelines for Americans, 2010  USDA's MyPlate  ASA Prophylaxis  Lung CA Screening    Tenzin Alves MD, MD  [facility]

## 2019-02-06 ENCOUNTER — OFFICE VISIT (OUTPATIENT)
Dept: FAMILY MEDICINE | Facility: OTHER | Age: 64
End: 2019-02-06
Payer: COMMERCIAL

## 2019-02-06 VITALS
WEIGHT: 170 LBS | DIASTOLIC BLOOD PRESSURE: 72 MMHG | SYSTOLIC BLOOD PRESSURE: 128 MMHG | RESPIRATION RATE: 16 BRPM | HEART RATE: 68 BPM | HEIGHT: 70 IN | BODY MASS INDEX: 24.34 KG/M2 | TEMPERATURE: 97.3 F

## 2019-02-06 DIAGNOSIS — L57.0 AK (ACTINIC KERATOSIS): ICD-10-CM

## 2019-02-06 DIAGNOSIS — I73.00 RAYNAUD'S PHENOMENON WITHOUT GANGRENE: ICD-10-CM

## 2019-02-06 DIAGNOSIS — B07.8 OTHER VIRAL WARTS: ICD-10-CM

## 2019-02-06 DIAGNOSIS — Z23 NEED FOR PROPHYLACTIC VACCINATION AND INOCULATION AGAINST INFLUENZA: ICD-10-CM

## 2019-02-06 DIAGNOSIS — Z00.00 PREVENTATIVE HEALTH CARE: Primary | ICD-10-CM

## 2019-02-06 DIAGNOSIS — E78.5 HYPERLIPIDEMIA WITH TARGET LDL LESS THAN 130: ICD-10-CM

## 2019-02-06 LAB
ALBUMIN SERPL-MCNC: 3.8 G/DL (ref 3.4–5)
ALP SERPL-CCNC: 65 U/L (ref 40–150)
ALT SERPL W P-5'-P-CCNC: 40 U/L (ref 0–70)
ANION GAP SERPL CALCULATED.3IONS-SCNC: 5 MMOL/L (ref 3–14)
AST SERPL W P-5'-P-CCNC: 17 U/L (ref 0–45)
BILIRUB SERPL-MCNC: 0.3 MG/DL (ref 0.2–1.3)
BUN SERPL-MCNC: 15 MG/DL (ref 7–30)
CALCIUM SERPL-MCNC: 8.4 MG/DL (ref 8.5–10.1)
CHLORIDE SERPL-SCNC: 106 MMOL/L (ref 94–109)
CHOLEST SERPL-MCNC: 143 MG/DL
CO2 SERPL-SCNC: 31 MMOL/L (ref 20–32)
CREAT SERPL-MCNC: 1.16 MG/DL (ref 0.66–1.25)
GFR SERPL CREATININE-BSD FRML MDRD: 67 ML/MIN/{1.73_M2}
GLUCOSE SERPL-MCNC: 89 MG/DL (ref 70–99)
HDLC SERPL-MCNC: 50 MG/DL
LDLC SERPL CALC-MCNC: 71 MG/DL
NONHDLC SERPL-MCNC: 93 MG/DL
POTASSIUM SERPL-SCNC: 4.2 MMOL/L (ref 3.4–5.3)
PROT SERPL-MCNC: 7.3 G/DL (ref 6.8–8.8)
SODIUM SERPL-SCNC: 142 MMOL/L (ref 133–144)
TRIGL SERPL-MCNC: 109 MG/DL

## 2019-02-06 PROCEDURE — 17000 DESTRUCT PREMALG LESION: CPT | Mod: 59 | Performed by: FAMILY MEDICINE

## 2019-02-06 PROCEDURE — 90471 IMMUNIZATION ADMIN: CPT | Performed by: FAMILY MEDICINE

## 2019-02-06 PROCEDURE — 17110 DESTRUCTION B9 LES UP TO 14: CPT | Performed by: FAMILY MEDICINE

## 2019-02-06 PROCEDURE — 36415 COLL VENOUS BLD VENIPUNCTURE: CPT | Performed by: FAMILY MEDICINE

## 2019-02-06 PROCEDURE — 99213 OFFICE O/P EST LOW 20 MIN: CPT | Mod: 25 | Performed by: FAMILY MEDICINE

## 2019-02-06 PROCEDURE — 80053 COMPREHEN METABOLIC PANEL: CPT | Performed by: FAMILY MEDICINE

## 2019-02-06 PROCEDURE — 99396 PREV VISIT EST AGE 40-64: CPT | Mod: 25 | Performed by: FAMILY MEDICINE

## 2019-02-06 PROCEDURE — 90682 RIV4 VACC RECOMBINANT DNA IM: CPT | Performed by: FAMILY MEDICINE

## 2019-02-06 PROCEDURE — 80061 LIPID PANEL: CPT | Performed by: FAMILY MEDICINE

## 2019-02-06 RX ORDER — ATORVASTATIN CALCIUM 40 MG/1
20 TABLET, FILM COATED ORAL DAILY
Qty: 90 TABLET | Refills: 3 | Status: SHIPPED | OUTPATIENT
Start: 2019-02-06 | End: 2020-02-07

## 2019-02-06 ASSESSMENT — ENCOUNTER SYMPTOMS
HEADACHES: 0
SORE THROAT: 0
FREQUENCY: 0
PALPITATIONS: 0
CHILLS: 0
NERVOUS/ANXIOUS: 0
SHORTNESS OF BREATH: 0
JOINT SWELLING: 0
EYE PAIN: 0
NAUSEA: 0
MYALGIAS: 0
HEARTBURN: 0
PARESTHESIAS: 0
DYSURIA: 0
COUGH: 0
ABDOMINAL PAIN: 0
DIARRHEA: 0
HEMATOCHEZIA: 0
WEAKNESS: 0
CONSTIPATION: 0
ARTHRALGIAS: 0
HEMATURIA: 0
DIZZINESS: 0
FEVER: 0

## 2019-02-06 ASSESSMENT — MIFFLIN-ST. JEOR: SCORE: 1573.92

## 2019-02-06 ASSESSMENT — PAIN SCALES - GENERAL: PAINLEVEL: NO PAIN (0)

## 2019-02-06 NOTE — PROGRESS NOTES
Prior to injection, verified patient identity using patient's name and date of birth.  Due to injection administration, patient instructed to remain in clinic for 15 minutes  afterwards, and to report any adverse reaction to me immediately.      Injectable Influenza Immunization Documentation    1.  Is the person to be vaccinated sick today?   No    2. Does the person to be vaccinated have an allergy to a component   of the vaccine?   No  Egg Allergy Algorithm Link    3. Has the person to be vaccinated ever had a serious reaction   to influenza vaccine in the past?   No    4. Has the person to be vaccinated ever had Guillain-Barré syndrome?   No    Form completed by Katty Tompkins

## 2019-03-20 NOTE — PROGRESS NOTES
SUBJECTIVE:   Evan Rios is a 63 year old male who presents to clinic today for the following health issues:      History of Present Illness     Diet:  Regular (no restrictions)  Frequency of exercise:  6-7 days/week  Duration of exercise:  15-30 minutes  Taking medications regularly:  Yes  Medication side effects:  Not applicable  Additional concerns today:  No    WART(S)  Onset: Sept-Oct    Description:   Location: Left side of head  Number of warts: 1  Painful: no    Accompanying Signs & Symptoms:  Signs of infection: no    History:   History of trauma: no  Prior warts: no     Therapies Tried and outcome: liquid nitrogen  Problem list and histories reviewed & adjusted, as indicated.  Additional history: as documented    Current Outpatient Medications   Medication Sig Dispense Refill     aspirin (ASA) 81 MG tablet Take 1 tablet (81 mg) by mouth daily 90 tablet 3     atorvastatin (LIPITOR) 40 MG tablet Take 0.5 tablets (20 mg) by mouth daily 90 tablet 3     Recent Labs   Lab Test 02/06/19  1549 12/22/17  1026 11/09/16  1421 10/09/15  1059  02/27/13 01/06/12   A1C  --   --   --   --   --  6.0 5.8   LDL 71 74 84 71   < > 72 92   HDL 50 51 58 58   < > 46 48   TRIG 109 84 79 72   < > 84 113   ALT 40  --  25 18   < > 18 17   CR 1.16  --  1.25 0.89   < >  --  1.09   GFRESTIMATED 67  --  59* 87   < >  --   --    GFRESTBLACK 77  --  71 >90   GFR Calc     < >  --   --    POTASSIUM 4.2  --  4.3 3.7   < >  --  3.9   TSH  --   --  1.49  --   --   --  1.533    < > = values in this interval not displayed.      BP Readings from Last 3 Encounters:   03/25/19 122/82   02/06/19 128/72   02/14/18 142/70    Wt Readings from Last 3 Encounters:   03/25/19 76.1 kg (167 lb 11.2 oz)   02/06/19 77.1 kg (170 lb)   03/14/18 76.2 kg (168 lb)                 ROS:  Constitutional, HEENT, cardiovascular, pulmonary, gi and gu systems are negative, except as otherwise noted.    OBJECTIVE:     /82 (BP Location: Left arm,  "Patient Position: Sitting, Cuff Size: Adult Regular)   Pulse 63   Temp 97.8  F (36.6  C) (Temporal)   Resp 14   Ht 1.772 m (5' 9.78\")   Wt 76.1 kg (167 lb 11.2 oz)   SpO2 97%   BMI 24.21 kg/m    Body mass index is 24.21 kg/m .  GENERAL: healthy, alert and no distress  SKIN: Ulcerated papule with recent bleeding noted on posterior left occiput.  Normal hair growth around this.  Mobile in skin.    Diagnostic Test Results:  Results for orders placed or performed in visit on 02/06/19   Lipid panel reflex to direct LDL Non-fasting   Result Value Ref Range    Cholesterol 143 <200 mg/dL    Triglycerides 109 <150 mg/dL    HDL Cholesterol 50 >39 mg/dL    LDL Cholesterol Calculated 71 <100 mg/dL    Non HDL Cholesterol 93 <130 mg/dL   Comprehensive metabolic panel   Result Value Ref Range    Sodium 142 133 - 144 mmol/L    Potassium 4.2 3.4 - 5.3 mmol/L    Chloride 106 94 - 109 mmol/L    Carbon Dioxide 31 20 - 32 mmol/L    Anion Gap 5 3 - 14 mmol/L    Glucose 89 70 - 99 mg/dL    Urea Nitrogen 15 7 - 30 mg/dL    Creatinine 1.16 0.66 - 1.25 mg/dL    GFR Estimate 67 >60 mL/min/[1.73_m2]    GFR Estimate If Black 77 >60 mL/min/[1.73_m2]    Calcium 8.4 (L) 8.5 - 10.1 mg/dL    Bilirubin Total 0.3 0.2 - 1.3 mg/dL    Albumin 3.8 3.4 - 5.0 g/dL    Protein Total 7.3 6.8 - 8.8 g/dL    Alkaline Phosphatase 65 40 - 150 U/L    ALT 40 0 - 70 U/L    AST 17 0 - 45 U/L       ASSESSMENT/PLAN:       ICD-10-CM    1. Skin lesion of scalp L98.9 scalp, neck, hands, feet, genitalia, other     Dermatological path order and indications     Given recurrent nature of this despite cryotherapy, I recommended biopsy to ensure that it is not something more pathologic.  Discussed risks, benefits, and alternatives to treatment.  Patient wanted to proceed with shave excision.  Please see procedure note for additional details.  Patient tolerated procedure well.  Await pathology results.    Portions of this note were completed using Dragon dictation software. "  Although reviewed, there may be typographical and other inadvertent errors that remain.     See Patient Instructions    Tenzin Alves MD, MD  [facility]

## 2019-03-25 ENCOUNTER — OFFICE VISIT (OUTPATIENT)
Dept: FAMILY MEDICINE | Facility: OTHER | Age: 64
End: 2019-03-25
Payer: COMMERCIAL

## 2019-03-25 ENCOUNTER — OFFICE VISIT (OUTPATIENT)
Dept: AUDIOLOGY | Facility: OTHER | Age: 64
End: 2019-03-25
Payer: COMMERCIAL

## 2019-03-25 VITALS
HEIGHT: 70 IN | WEIGHT: 167.7 LBS | BODY MASS INDEX: 24.01 KG/M2 | TEMPERATURE: 97.8 F | HEART RATE: 63 BPM | SYSTOLIC BLOOD PRESSURE: 122 MMHG | RESPIRATION RATE: 14 BRPM | OXYGEN SATURATION: 97 % | DIASTOLIC BLOOD PRESSURE: 82 MMHG

## 2019-03-25 DIAGNOSIS — L98.9 SKIN LESION OF SCALP: Primary | ICD-10-CM

## 2019-03-25 DIAGNOSIS — H90.3 SENSORINEURAL HEARING LOSS, BILATERAL: Primary | ICD-10-CM

## 2019-03-25 PROCEDURE — 99207 ZZC DROP WITH A PROCEDURE: CPT | Mod: 25 | Performed by: FAMILY MEDICINE

## 2019-03-25 PROCEDURE — 99207 ZZC NO CHARGE LOS: CPT | Performed by: AUDIOLOGIST

## 2019-03-25 PROCEDURE — 88305 TISSUE EXAM BY PATHOLOGIST: CPT | Mod: TC | Performed by: FAMILY MEDICINE

## 2019-03-25 PROCEDURE — 11102 TANGNTL BX SKIN SINGLE LES: CPT | Performed by: FAMILY MEDICINE

## 2019-03-25 ASSESSMENT — PAIN SCALES - GENERAL: PAINLEVEL: NO PAIN (0)

## 2019-03-25 ASSESSMENT — MIFFLIN-ST. JEOR: SCORE: 1563.44

## 2019-03-25 NOTE — PATIENT INSTRUCTIONS
Wound Care Instructions    1.  Keep Vaseline or antibiotic ointment on wound for a few days until the skin edges have come together.  Do not let it dry out and form a scab as this will make the resulting scar more noticeable.    2.  After 24 hours, may wash gently with soap and water.  After 48 hours, you can soak it, if needed.    3.  If desired, vitamin E oil for 2 weeks after antibiotic ointment may help to decrease scarring.    4.  Protect the area from sun for up to one year afterward as the scar is continuing to remodel.  Sun exposure will also make the resulting scar more noticeable.    5.  Call if the area is very red, tender, has a discharge or is very itchy while healing, or if you have any other questions.  These may be signs of early infection or allergy.

## 2019-03-25 NOTE — PROGRESS NOTES
HEARING AID RECHECK    Patient Name:  Evan Rios    Patient Age:   63 year old    :  1955    Background:   The patient reports that his hearing aids are not working at all. He reports that there had been a static sound from the left ear before it went completely dead. He reports that the hearing aids have not been charging with the [employer] . He tried regular zinc air batteries with the hearing aids and they still did not work.    Procedures:   A listening check confirmed the complaints as there was no sound from either hearing aid with either the Z Power or fresh zinc air batteries. The hearing aids were plugged into the  with the Z Power batteries and the light was solid red, indicating a battery problem. The hearing aids and  will be sent in for repair.    Plan:   The patient will be contacted when the hearing aids return from repair.    NO CHARGE VISIT    Aliza Lopez, CCC-A  [place] Licensed Audiologist #[id number]    Jose Coburn MA, CCC-A  [place] Licensed Audiologist #[id number]  3/25/2019

## 2019-03-25 NOTE — PROGRESS NOTES
Subjective: Evan Rios a 63 year old male who presents today for lesion removal. The lesion(s) is/are located on the occiput, number 1 and measures 0.6cm.  The patient reports the lesion is bleeding and denies other significant symptoms on ROS. Medications reviewed.    Pause for the cause has been completed prior to the prceedure.   1. Evan was identified by both name and date of birth   2. The correct site was identified   3. Site was marked by provider    4. Written informed consent correct and signed or verbal authorization  to proceed was obtained   5. Verifed necessary supplies, equipment, and diagnostics are available    6. Time out was performed immediately prior to procedure    Objective: The lesion(s) is/are of the above mentioned size and location and is/are mildly ulcerated. The area was prepped and appropriately anesthetized using 1% lidocaine with epinephrine. Using the usual technique, shave excision was performed. An appropriate dressing was applied. The procedure was well tolerated and without complications.     Assessment: rule out basal cell carcinoma    Plan: Follow up: The specimen is labelled and sent to pathology for evaluation., The patient may return prn.. Wound care instructions provided.  Patient was instructed to be alert for any signs of cutaneous infection.   Answers for HPI/ROS submitted by the patient on 3/25/2019   Chronic problems general questions HPI Form  Diet:: Regular (no restrictions)  Frequency of exercise:: 6-7 days/week  Taking medications regularly:: Yes  Medication side effects:: Not applicable  Additional concerns today:: No  Duration of exercise:: 15-30 minutes

## 2019-03-26 LAB — COPATH REPORT: NORMAL

## 2019-03-26 NOTE — RESULT ENCOUNTER NOTE
Evan,    Good news!  Your skin spot was actually an abnormal collection of blood vessels that would get irritated and slowly grow.  That's probably why it was bleeding easily.  The cautery will likely help to prevent this from recurring.  If it does, we can more aggressively cauterize it, which will usually get rid of it.  Freezing doesn't usually work with this type of lesion.      It is not cancerous and doesn't require further treatment if not bothering you.    Have a nice day!    Dr. Alves

## 2019-04-01 ENCOUNTER — TELEPHONE (OUTPATIENT)
Dept: AUDIOLOGY | Facility: OTHER | Age: 64
End: 2019-04-01

## 2019-06-07 ENCOUNTER — TELEPHONE (OUTPATIENT)
Dept: AUDIOLOGY | Facility: CLINIC | Age: 64
End: 2019-06-07

## 2019-06-07 NOTE — TELEPHONE ENCOUNTER
Reason for Call:  Other call back    Detailed comments: Patient is asking if he can get batteries for his hearing aids and can these be mailed to him?  Please advise    Phone Number Patient can be reached at: Home number on file [phone] (home)    Best Time: any    Can we leave a detailed message on this number? YES    Call taken on 6/7/2019 at 11:15 AM by Danii Lopez

## 2019-06-11 NOTE — TELEPHONE ENCOUNTER
Left a message asking the patient if he would prefer to order batteries from us or purchase them elsewhere, and asking how many packs of batteries he would like if he wants to order them from [place]. Left the [facility] [place] [facility] number and requested that the patient call back with his answer.    Loreto Lopez, CCC-A  6/11/2019

## 2019-06-11 NOTE — TELEPHONE ENCOUNTER
Called  and asked about [employer] batteries. They will send a set of batteries at no charge. Called patient back to inform him. I will call the patient when the batteries arrive and he can pick them up at the  at the [facility].    Loreto Lopez, CCC-A  6/11/2019

## 2019-06-11 NOTE — TELEPHONE ENCOUNTER
Patient returned call. I spoke to him and he clarified that he was interested in purchasing replacement Z Power rechargeable batteries. I informed the patient that I was not sure of the cost but would look into it and call him back.    Loreto Lopez, CCC-A  6/11/2019

## 2019-06-19 ENCOUNTER — TELEPHONE (OUTPATIENT)
Dept: AUDIOLOGY | Facility: OTHER | Age: 64
End: 2019-06-19

## 2019-06-19 NOTE — TELEPHONE ENCOUNTER
Informed patient that the rechargeable hearing aid batteries are ready to be picked up at the [facility] . Patient stated that he will pick them up tomorrow.    Loreto Lopez, CCC-A  6/19/2019

## 2019-07-18 ENCOUNTER — TELEPHONE (OUTPATIENT)
Dept: AUDIOLOGY | Facility: OTHER | Age: 64
End: 2019-07-18

## 2019-07-18 NOTE — TELEPHONE ENCOUNTER
Pt calling states a piece of his hearing aid is stuck in his ear. It is not painful but his wife can see it. He is wondering if he could come in tomorrow to have it removed.    If pt does not hear back from audiology he will try again tomorrow.    Saundra Chakraborty, RN, BSN

## 2019-07-19 ENCOUNTER — OFFICE VISIT (OUTPATIENT)
Dept: FAMILY MEDICINE | Facility: OTHER | Age: 64
End: 2019-07-19
Payer: COMMERCIAL

## 2019-07-19 VITALS
HEART RATE: 72 BPM | TEMPERATURE: 98.1 F | DIASTOLIC BLOOD PRESSURE: 76 MMHG | SYSTOLIC BLOOD PRESSURE: 124 MMHG | BODY MASS INDEX: 23.19 KG/M2 | HEIGHT: 70 IN | OXYGEN SATURATION: 98 % | WEIGHT: 162 LBS | RESPIRATION RATE: 16 BRPM

## 2019-07-19 DIAGNOSIS — T16.1XXA FOREIGN BODY OF RIGHT EAR, INITIAL ENCOUNTER: Primary | ICD-10-CM

## 2019-07-19 PROCEDURE — 99213 OFFICE O/P EST LOW 20 MIN: CPT | Performed by: FAMILY MEDICINE

## 2019-07-19 ASSESSMENT — PAIN SCALES - GENERAL: PAINLEVEL: NO PAIN (0)

## 2019-07-19 ASSESSMENT — MIFFLIN-ST. JEOR: SCORE: 1532.59

## 2019-07-19 NOTE — ASSESSMENT & PLAN NOTE
Patient is a pleasant 64-year-old who had his hearing aid Stuck in the right ear.  His wife tried to remove it at home and in the process posted in further.  Today on exam he was noted to have fresh blood in the ear canal with a seated deep close to the tympanic membrane.  I used the alligator forceps to hold the edge and removed it safely.  Tympanic membrane seems to be intact on the exam after removal.  Discussed to hold aspirin for the next couple of days to prevent further bleeding.

## 2019-07-19 NOTE — PROGRESS NOTES
Subjective     Evan Rios is a 64 year old male who presents to clinic today for the following health issues:    HPI     Concern - Hearing Aid Cap- Right Ear  Onset: x 1 day    Description:   Hearing Aid cap stuck in right ear.  Didn't realize what had happened and tried to put hearing aids in again and pushed further back.    Intensity: mild    Progression of Symptoms:  same    Accompanying Signs & Symptoms:  Bleeding a little last night    Previous history of similar problem:   No    Precipitating factors:   Worsened by: NA    Alleviating factors:  Improved by: NA  Therapies Tried and outcome: Wife tried to pull out- didn't work      -------------------------------------    Patient Active Problem List   Diagnosis     Hyperlipidemia with target LDL less than 130     Vertigo     Ganglion cyst     AK (actinic keratosis)     Raynaud's phenomenon without gangrene     Foreign body of right ear, initial encounter     Past Surgical History:   Procedure Laterality Date     HERNIA REPAIR         Social History     Tobacco Use     Smoking status: Never Smoker     Smokeless tobacco: Never Used   Substance Use Topics     Alcohol use: Yes     Family History   Problem Relation Age of Onset     Hypertension Father      Cardiovascular Father 42        heart attack         Current Outpatient Medications   Medication Sig Dispense Refill     aspirin (ASA) 81 MG tablet Take 1 tablet (81 mg) by mouth daily 90 tablet 3     atorvastatin (LIPITOR) 40 MG tablet Take 0.5 tablets (20 mg) by mouth daily 90 tablet 3     No Known Allergies  Recent Labs   Lab Test 02/06/19  1549 12/22/17  1026 11/09/16  1421 10/09/15  1059  02/27/13 01/06/12   A1C  --   --   --   --   --  6.0 5.8   LDL 71 74 84 71   < > 72 92   HDL 50 51 58 58   < > 46 48   TRIG 109 84 79 72   < > 84 113   ALT 40  --  25 18   < > 18 17   CR 1.16  --  1.25 0.89   < >  --  1.09   GFRESTIMATED 67  --  59* 87   < >  --   --    GFRESTBLACK 77  --  71 >90   GFR  "Calc     < >  --   --    POTASSIUM 4.2  --  4.3 3.7   < >  --  3.9   TSH  --   --  1.49  --   --   --  1.533    < > = values in this interval not displayed.      BP Readings from Last 3 Encounters:   07/19/19 124/76   03/25/19 122/82   02/06/19 128/72    Wt Readings from Last 3 Encounters:   07/19/19 73.5 kg (162 lb)   03/25/19 76.1 kg (167 lb 11.2 oz)   02/06/19 77.1 kg (170 lb)                    Reviewed and updated as needed this visit by Provider  Tobacco  Allergies  Meds  Problems  Med Hx  Surg Hx  Fam Hx         Review of Systems   All other systems reviewed and are negative.     ROS COMP: Constitutional, HEENT, cardiovascular, pulmonary, gi and gu systems are negative, except as otherwise noted.      Objective    /76 (BP Location: Right arm, Patient Position: Chair, Cuff Size: Adult Regular)   Pulse 72   Temp 98.1  F (36.7  C) (Temporal)   Resp 16   Ht 1.772 m (5' 9.78\")   Wt 73.5 kg (162 lb)   SpO2 98%   BMI 23.39 kg/m    Body mass index is 23.39 kg/m .  Physical Exam   Constitutional: He is oriented to person, place, and time. He appears well-developed and well-nourished.   HENT:   Head: Normocephalic and atraumatic.   See assessment. Normal left ear. Foreign body in the right ear   Neurological: He is alert and oriented to person, place, and time.   Psychiatric: He has a normal mood and affect.          Diagnostic Test Results:  Labs reviewed in Epic        Assessment & Plan   Problem List Items Addressed This Visit     Foreign body of right ear, initial encounter - Primary     Patient is a pleasant 64-year-old who had his hearing aid Stuck in the right ear.  His wife tried to remove it at home and in the process posted in further.  Today on exam he was noted to have fresh blood in the ear canal with a seated deep close to the tympanic membrane.  I used the alligator forceps to hold the edge and removed it safely.  Tympanic membrane seems to be intact on the exam after removal.  " Discussed to hold aspirin for the next couple of days to prevent further bleeding.                    See Patient Instructions  Return in about 1 year (around 7/19/2020) for Physical Exam.    Maritza Kwok MD  [facility]

## 2019-07-19 NOTE — TELEPHONE ENCOUNTER
Patient had object removed today in a different clinic. Closing encounter.  Darline Ulrich RN on 7/19/2019 at 11:55 AM

## 2020-02-03 NOTE — PATIENT INSTRUCTIONS
Keep up the good work!    We'll let you know your lab results as soon as we can.     Can try glucosamine (1500 mg/day) for any arthritis component of the pain.  Should take for four weeks before deciding it's not helping.  If you would like X-rays to verify the diagnosis, let me know.    If your hip bothers you more, let me know.      Contact us or return if questions or concerns.     Have a nice day!    Dr. Alves     Preventive Health Recommendations  Male Ages 50 - 64    Yearly exam:             See your health care provider every year in order to  o   Review health changes.   o   Discuss preventive care.    o   Review your medicines if your doctor has prescribed any.     Have a cholesterol test every 5 years, or more frequently if you are at risk for high cholesterol/heart disease.     Have a diabetes test (fasting glucose) every three years. If you are at risk for diabetes, you should have this test more often.     Have a colonoscopy at age 50, or have a yearly FIT test (stool test). These exams will check for colon cancer.      Talk with your health care provider about whether or not a prostate cancer screening test (PSA) is right for you.    You should be tested each year for STDs (sexually transmitted diseases), if you re at risk.     Shots: Get a flu shot each year. Get a tetanus shot every 10 years.     Nutrition:    Eat at least 5 servings of fruits and vegetables daily.     Eat whole-grain bread, whole-wheat pasta and brown rice instead of white grains and rice.     Get adequate Calcium and Vitamin D.     Lifestyle    Exercise for at least 150 minutes a week (30 minutes a day, 5 days a week). This will help you control your weight and prevent disease.     Limit alcohol to one drink per day.     No smoking.     Wear sunscreen to prevent skin cancer.     See your dentist every six months for an exam and cleaning.     See your eye doctor every 1 to 2 years.

## 2020-02-03 NOTE — PROGRESS NOTES
SUBJECTIVE:   CC: Evan Rios is an 64 year old male who presents for preventative health visit.     Healthy Habits:     Getting at least 3 servings of Calcium per day:  Yes    Bi-annual eye exam:  Yes    Dental care twice a year:  Yes    Sleep apnea or symptoms of sleep apnea:  None    Diet:  Regular (no restrictions)    Frequency of exercise:  6-7 days/week    Duration of exercise:  30-45 minutes    Taking medications regularly:  Yes    Medication side effects:  None    PHQ-2 Total Score: 0    Additional concerns today:  No    Discussed that any evaluation and treatment that is not considered preventive will incur additional charges.  Pt indicated that he understood and did want to proceed with an evaluation and management portion of the visit in addition to the preventive portion.        Hernia      Duration: August    Description (location/character/radiation): right side hip/abdomin     Intensity:  mild    Accompanying signs and symptoms: none    History (similar episodes/previous evaluation): None    Precipitating or alleviating factors: None    Therapies tried and outcome: None     Pt felt a pop near his hip doing a heavy lift in August.  Lifting the deck of a [employer].  Had a lot of pain at that time.  He wonders if he popped his hip out and then back in.  Was able to stay active afterwards.      His index finger on right hand is still stiff, no worse than last year.  Middle finger on left hand is now stiff and his pinky on that hand is radially deviated.  Has had Raynaud's as well.  This hasn't worsened.      No family history of inflammatory arthritis or autoimmune disease.      Today's PHQ-2 Score:   PHQ-2 ( 1999 Pfizer) 2/7/2020   Q1: Little interest or pleasure in doing things 0   Q2: Feeling down, depressed or hopeless 0   PHQ-2 Score 0   Q1: Little interest or pleasure in doing things Not at all   Q2: Feeling down, depressed or hopeless Not at all   PHQ-2 Score 0       Abuse: Current or  Past(Physical, Sexual or Emotional)- No  Do you feel safe in your environment? Yes    Have you ever done Advance Care Planning? (For example, a Health Directive, POLST, or a discussion with a medical provider or your loved ones about your wishes): No, advance care planning information given to patient to review.  Patient declined advance care planning discussion at this time.    Social History     Tobacco Use     Smoking status: Never Smoker     Smokeless tobacco: Never Used   Substance Use Topics     Alcohol use: Yes         Alcohol Use 2/7/2020   Prescreen: >3 drinks/day or >7 drinks/week? No   Prescreen: >3 drinks/day or >7 drinks/week? -       Last PSA:   PSA   Date Value Ref Range Status   02/07/2020 1.97 0 - 4 ug/L Final     Comment:     Assay Method:  Chemiluminescence using Siemens Vista analyzer       Reviewed orders with patient. Reviewed health maintenance and updated orders accordingly - Yes  BP Readings from Last 3 Encounters:   02/07/20 122/60   07/19/19 124/76   03/25/19 122/82    Wt Readings from Last 3 Encounters:   02/07/20 76.7 kg (169 lb 3.2 oz)   07/19/19 73.5 kg (162 lb)   03/25/19 76.1 kg (167 lb 11.2 oz)                  Patient Active Problem List   Diagnosis     Hyperlipidemia with target LDL less than 130     Vertigo     Ganglion cyst     AK (actinic keratosis)     Raynaud's phenomenon without gangrene     Foreign body of right ear, initial encounter     Past Surgical History:   Procedure Laterality Date     HERNIA REPAIR         Social History     Tobacco Use     Smoking status: Never Smoker     Smokeless tobacco: Never Used   Substance Use Topics     Alcohol use: Yes     Family History   Problem Relation Age of Onset     Hypertension Father      Cardiovascular Father 42        heart attack     Prostate Cancer Brother          Current Outpatient Medications   Medication Sig Dispense Refill     aspirin (ASA) 81 MG tablet Take 1 tablet (81 mg) by mouth daily 90 tablet 3     atorvastatin  "(LIPITOR) 40 MG tablet Take 0.5 tablets (20 mg) by mouth daily 90 tablet 3     No Known Allergies  Recent Labs   Lab Test 02/07/20  0946 02/06/19  1549 12/22/17  1026 11/09/16  1421  02/27/13 01/06/12   A1C  --   --   --   --   --  6.0 5.8   LDL 70 71 74 84   < > 72 92   HDL 62 50 51 58   < > 46 48   TRIG 89 109 84 79   < > 84 113   ALT 61 40  --  25   < > 18 17   CR 1.16 1.16  --  1.25   < >  --  1.09   GFRESTIMATED 66 67  --  59*   < >  --   --    GFRESTBLACK 77 77  --  71   < >  --   --    POTASSIUM 4.2 4.2  --  4.3   < >  --  3.9   TSH  --   --   --  1.49  --   --  1.533    < > = values in this interval not displayed.        Reviewed and updated as needed this visit by clinical staff  Tobacco  Allergies  Meds  Med Hx  Surg Hx  Fam Hx  Soc Hx        Reviewed and updated as needed this visit by Provider  Meds            Review of Systems   Constitutional: Negative for chills and fever.   HENT: Negative for congestion, ear pain, hearing loss and sore throat.    Eyes: Negative for pain and visual disturbance.   Respiratory: Negative for cough and shortness of breath.    Cardiovascular: Negative for chest pain, palpitations and peripheral edema.   Gastrointestinal: Negative for abdominal pain, constipation, diarrhea, heartburn, hematochezia and nausea.   Genitourinary: Negative for discharge, dysuria, frequency, genital sores, hematuria, impotence and urgency.   Musculoskeletal: Negative for arthralgias, joint swelling and myalgias.   Skin: Negative for rash.   Neurological: Negative for dizziness, weakness, headaches and paresthesias.   Psychiatric/Behavioral: Negative for mood changes. The patient is not nervous/anxious.          OBJECTIVE:   /60   Pulse 60   Temp 97.3  F (36.3  C) (Temporal)   Resp 16   Ht 1.79 m (5' 10.47\")   Wt 76.7 kg (169 lb 3.2 oz)   SpO2 98%   BMI 23.95 kg/m      Physical Exam  GENERAL: healthy, alert and no distress  EYES: Eyes grossly normal to inspection, PERRL and " conjunctivae and sclerae normal  HENT: ear canals and TM's normal, nose and mouth without ulcers or lesions  NECK: no adenopathy, no asymmetry, masses, or scars and thyroid normal to palpation  RESP: lungs clear to auscultation - no rales, rhonchi or wheezes  CV: regular rate and rhythm, normal S1 S2, no S3 or S4, no murmur, click or rub, no peripheral edema and peripheral pulses strong  ABDOMEN: soft, nontender, no hepatosplenomegaly, no masses and bowel sounds normal   (male): normal male genitalia without lesions or urethral discharge, no hernia  MS: no gross musculoskeletal defects noted, no edema  SKIN: no suspicious lesions or rashes  NEURO: Normal strength and tone, mentation intact and speech normal  PSYCH: mentation appears normal, affect normal/bright    Diagnostic Test Results:  Labs reviewed in Epic  Results for orders placed or performed in visit on 02/07/20   Lipid panel reflex to direct LDL Fasting     Status: None   Result Value Ref Range    Cholesterol 150 <200 mg/dL    Triglycerides 89 <150 mg/dL    HDL Cholesterol 62 >39 mg/dL    LDL Cholesterol Calculated 70 <100 mg/dL    Non HDL Cholesterol 88 <130 mg/dL   PSA, screen     Status: None   Result Value Ref Range    PSA 1.97 0 - 4 ug/L   Comprehensive metabolic panel     Status: None   Result Value Ref Range    Sodium 141 133 - 144 mmol/L    Potassium 4.2 3.4 - 5.3 mmol/L    Chloride 108 94 - 109 mmol/L    Carbon Dioxide 29 20 - 32 mmol/L    Anion Gap 4 3 - 14 mmol/L    Glucose 93 70 - 99 mg/dL    Urea Nitrogen 14 7 - 30 mg/dL    Creatinine 1.16 0.66 - 1.25 mg/dL    GFR Estimate 66 >60 mL/min/[1.73_m2]    GFR Estimate If Black 77 >60 mL/min/[1.73_m2]    Calcium 8.7 8.5 - 10.1 mg/dL    Bilirubin Total 0.6 0.2 - 1.3 mg/dL    Albumin 3.8 3.4 - 5.0 g/dL    Protein Total 7.4 6.8 - 8.8 g/dL    Alkaline Phosphatase 67 40 - 150 U/L    ALT 61 0 - 70 U/L    AST 30 0 - 45 U/L   CBC with platelets     Status: None   Result Value Ref Range    WBC 6.1 4.0 -  11.0 10e9/L    RBC Count 4.92 4.4 - 5.9 10e12/L    Hemoglobin 15.6 13.3 - 17.7 g/dL    Hematocrit 48.4 40.0 - 53.0 %    MCV 98 78 - 100 fl    MCH 31.7 26.5 - 33.0 pg    MCHC 32.2 31.5 - 36.5 g/dL    RDW 14.2 10.0 - 15.0 %    Platelet Count 193 150 - 450 10e9/L       ASSESSMENT/PLAN:       ICD-10-CM    1. Routine general medical examination at a health care facility Z00.00 Lipid panel reflex to direct LDL Fasting     PSA, screen     Comprehensive metabolic panel     CBC with platelets   2. Hyperlipidemia with target LDL less than 130 E78.5 atorvastatin (LIPITOR) 40 MG tablet     aspirin (ASA) 81 MG tablet   3. Stiffness of finger joint, right M25.641    4. Stiffness of finger joint, left M25.642    5. Hip pain, right M25.551    6. Need for vaccination Z23 C RIV4 (FLUBLOK) VACCINE RECOMBINANT DNA PRSRV ANTIBIO FREE, IM [10103]     1.  Reviewed recommended screenings and ordered appropriate testing for pt's risks and per pt's request(s).   2.  Currently controlled.  Will continue current regimen.  3, 4.  Unclear etiology.  Clinically most consistent with osteoarthritis.  Offered x-rays to further evaluate.  Discussed symptoms to watch for that might indicate inflammatory arthritis.  Can do trial of glucosamine or pain control with over-the-counter analgesics.  Follow-up if not improving.  5.  No evidence of hernia on exam.  Hip arthritis is also not appreciated.  Symptoms are improving, so we will simply monitor for now.  6.  Immunized.    Portions of this note were completed using Dragon dictation software.  Although reviewed, there may be typographical and other inadvertent errors that remain.             COUNSELING:   Reviewed preventive health counseling, as reflected in patient instructions       Regular exercise       Healthy diet/nutrition       Immunizations    Vaccinated for: Influenza             Aspirin Prophylaxsis       HIV screeninx in teen years, 1x in adult years, and at intervals if high risk        "Prostate cancer screening       Osteoporosis Prevention/Bone Health       The 10-year ASCVD risk score (Jose Carlos LYONS Jr., et al., 2013) is: 7%    Values used to calculate the score:      Age: 64 years      Sex: Male      Is Non- : No      Diabetic: No      Tobacco smoker: No      Systolic Blood Pressure: 122 mmHg      Is BP treated: No      HDL Cholesterol: 62 mg/dL      Total Cholesterol: 150 mg/dL    Estimated body mass index is 23.95 kg/m  as calculated from the following:    Height as of this encounter: 1.79 m (5' 10.47\").    Weight as of this encounter: 76.7 kg (169 lb 3.2 oz).          reports that he has never smoked. He has never used smokeless tobacco.      Counseling Resources:  ATP IV Guidelines  Pooled Cohorts Equation Calculator  FRAX Risk Assessment  ICSI Preventive Guidelines  Dietary Guidelines for Americans, 2010  USDA's MyPlate  ASA Prophylaxis  Lung CA Screening    Tenzin Alves MD, MD  [facility]    Patient Instructions   Keep up the good work!    We'll let you know your lab results as soon as we can.     Can try glucosamine (1500 mg/day) for any arthritis component of the pain.  Should take for four weeks before deciding it's not helping.  If you would like X-rays to verify the diagnosis, let me know.    If your hip bothers you more, let me know.      "

## 2020-02-07 ENCOUNTER — OFFICE VISIT (OUTPATIENT)
Dept: FAMILY MEDICINE | Facility: OTHER | Age: 65
End: 2020-02-07
Payer: COMMERCIAL

## 2020-02-07 VITALS
SYSTOLIC BLOOD PRESSURE: 122 MMHG | TEMPERATURE: 97.3 F | BODY MASS INDEX: 24.22 KG/M2 | OXYGEN SATURATION: 98 % | RESPIRATION RATE: 16 BRPM | DIASTOLIC BLOOD PRESSURE: 60 MMHG | WEIGHT: 169.2 LBS | HEART RATE: 60 BPM | HEIGHT: 70 IN

## 2020-02-07 DIAGNOSIS — M25.551 HIP PAIN, RIGHT: ICD-10-CM

## 2020-02-07 DIAGNOSIS — M25.642 STIFFNESS OF FINGER JOINT, LEFT: ICD-10-CM

## 2020-02-07 DIAGNOSIS — Z23 NEED FOR VACCINATION: ICD-10-CM

## 2020-02-07 DIAGNOSIS — M25.641 STIFFNESS OF FINGER JOINT, RIGHT: ICD-10-CM

## 2020-02-07 DIAGNOSIS — E78.5 HYPERLIPIDEMIA WITH TARGET LDL LESS THAN 130: ICD-10-CM

## 2020-02-07 DIAGNOSIS — Z00.00 ROUTINE GENERAL MEDICAL EXAMINATION AT A HEALTH CARE FACILITY: Primary | ICD-10-CM

## 2020-02-07 LAB
ALBUMIN SERPL-MCNC: 3.8 G/DL (ref 3.4–5)
ALP SERPL-CCNC: 67 U/L (ref 40–150)
ALT SERPL W P-5'-P-CCNC: 61 U/L (ref 0–70)
ANION GAP SERPL CALCULATED.3IONS-SCNC: 4 MMOL/L (ref 3–14)
AST SERPL W P-5'-P-CCNC: 30 U/L (ref 0–45)
BILIRUB SERPL-MCNC: 0.6 MG/DL (ref 0.2–1.3)
BUN SERPL-MCNC: 14 MG/DL (ref 7–30)
CALCIUM SERPL-MCNC: 8.7 MG/DL (ref 8.5–10.1)
CHLORIDE SERPL-SCNC: 108 MMOL/L (ref 94–109)
CHOLEST SERPL-MCNC: 150 MG/DL
CO2 SERPL-SCNC: 29 MMOL/L (ref 20–32)
CREAT SERPL-MCNC: 1.16 MG/DL (ref 0.66–1.25)
ERYTHROCYTE [DISTWIDTH] IN BLOOD BY AUTOMATED COUNT: 14.2 % (ref 10–15)
GFR SERPL CREATININE-BSD FRML MDRD: 66 ML/MIN/{1.73_M2}
GLUCOSE SERPL-MCNC: 93 MG/DL (ref 70–99)
HCT VFR BLD AUTO: 48.4 % (ref 40–53)
HDLC SERPL-MCNC: 62 MG/DL
HGB BLD-MCNC: 15.6 G/DL (ref 13.3–17.7)
LDLC SERPL CALC-MCNC: 70 MG/DL
MCH RBC QN AUTO: 31.7 PG (ref 26.5–33)
MCHC RBC AUTO-ENTMCNC: 32.2 G/DL (ref 31.5–36.5)
MCV RBC AUTO: 98 FL (ref 78–100)
NONHDLC SERPL-MCNC: 88 MG/DL
PLATELET # BLD AUTO: 193 10E9/L (ref 150–450)
POTASSIUM SERPL-SCNC: 4.2 MMOL/L (ref 3.4–5.3)
PROT SERPL-MCNC: 7.4 G/DL (ref 6.8–8.8)
PSA SERPL-ACNC: 1.97 UG/L (ref 0–4)
RBC # BLD AUTO: 4.92 10E12/L (ref 4.4–5.9)
SODIUM SERPL-SCNC: 141 MMOL/L (ref 133–144)
TRIGL SERPL-MCNC: 89 MG/DL
WBC # BLD AUTO: 6.1 10E9/L (ref 4–11)

## 2020-02-07 PROCEDURE — 80053 COMPREHEN METABOLIC PANEL: CPT | Performed by: FAMILY MEDICINE

## 2020-02-07 PROCEDURE — 90682 RIV4 VACC RECOMBINANT DNA IM: CPT | Performed by: FAMILY MEDICINE

## 2020-02-07 PROCEDURE — 99214 OFFICE O/P EST MOD 30 MIN: CPT | Mod: 25 | Performed by: FAMILY MEDICINE

## 2020-02-07 PROCEDURE — G0103 PSA SCREENING: HCPCS | Performed by: FAMILY MEDICINE

## 2020-02-07 PROCEDURE — 85027 COMPLETE CBC AUTOMATED: CPT | Performed by: FAMILY MEDICINE

## 2020-02-07 PROCEDURE — 90471 IMMUNIZATION ADMIN: CPT | Performed by: FAMILY MEDICINE

## 2020-02-07 PROCEDURE — 99396 PREV VISIT EST AGE 40-64: CPT | Mod: 25 | Performed by: FAMILY MEDICINE

## 2020-02-07 PROCEDURE — 36415 COLL VENOUS BLD VENIPUNCTURE: CPT | Performed by: FAMILY MEDICINE

## 2020-02-07 PROCEDURE — 80061 LIPID PANEL: CPT | Performed by: FAMILY MEDICINE

## 2020-02-07 RX ORDER — ATORVASTATIN CALCIUM 40 MG/1
20 TABLET, FILM COATED ORAL DAILY
Qty: 90 TABLET | Refills: 3 | Status: SHIPPED | OUTPATIENT
Start: 2020-02-07 | End: 2021-02-08

## 2020-02-07 ASSESSMENT — ENCOUNTER SYMPTOMS
HEARTBURN: 0
NAUSEA: 0
PALPITATIONS: 0
PARESTHESIAS: 0
JOINT SWELLING: 0
FEVER: 0
MYALGIAS: 0
CHILLS: 0
ABDOMINAL PAIN: 0
FREQUENCY: 0
SORE THROAT: 0
SHORTNESS OF BREATH: 0
DIZZINESS: 0
HEADACHES: 0
WEAKNESS: 0
COUGH: 0
DYSURIA: 0
CONSTIPATION: 0
HEMATOCHEZIA: 0
NERVOUS/ANXIOUS: 0
DIARRHEA: 0
HEMATURIA: 0
EYE PAIN: 0
ARTHRALGIAS: 0

## 2020-02-07 ASSESSMENT — MIFFLIN-ST. JEOR: SCORE: 1576.23

## 2020-02-07 ASSESSMENT — PAIN SCALES - GENERAL: PAINLEVEL: NO PAIN (0)

## 2020-12-20 ENCOUNTER — HEALTH MAINTENANCE LETTER (OUTPATIENT)
Age: 65
End: 2020-12-20

## 2021-02-04 NOTE — PROGRESS NOTES
SUBJECTIVE:   CC: Evan Rios is an 65 year old male who presents for preventative health visit.       Patient has been advised of split billing requirements and indicates understanding: Yes  Healthy Habits:     Getting at least 3 servings of Calcium per day:  Yes    Bi-annual eye exam:  Yes    Dental care twice a year:  Yes    Sleep apnea or symptoms of sleep apnea:  None    Diet:  Regular (no restrictions)    Frequency of exercise:  6-7 days/week    Duration of exercise:  15-30 minutes    Taking medications regularly:  Yes    Barriers to taking medications:  None    Medication side effects:  None    PHQ-2 Total Score: 0    Additional concerns today:  Yes (4th digit left hand stiffness)    Has lost range of motion and gotten stiffness of his ring finger of left hand.  It never swelled.  Doesn't look different.  Painful.  Doesn't recall trauma, but started after a fishing trip.  Hasn't tried anything for it.      Lipids are going well.        Today's PHQ-2 Score:   PHQ-2 ( 1999 Pfizer) 2/5/2021   Q1: Little interest or pleasure in doing things 0   Q2: Feeling down, depressed or hopeless 0   PHQ-2 Score 0   Q1: Little interest or pleasure in doing things Not at all   Q2: Feeling down, depressed or hopeless Not at all   PHQ-2 Score 0       Abuse: Current or Past(Physical, Sexual or Emotional)- No  Do you feel safe in your environment? Yes        Social History     Tobacco Use     Smoking status: Never Smoker     Smokeless tobacco: Never Used   Substance Use Topics     Alcohol use: Yes     If you drink alcohol do you typically have >3 drinks per day or >7 drinks per week? No    Alcohol Use 2/8/2021   Prescreen: >3 drinks/day or >7 drinks/week? -   Prescreen: >3 drinks/day or >7 drinks/week? No       Last PSA:   PSA   Date Value Ref Range Status   02/07/2020 1.97 0 - 4 ug/L Final     Comment:     Assay Method:  Chemiluminescence using Siemens Vista analyzer       Reviewed orders with patient. Reviewed health  maintenance and updated orders accordingly - Yes  BP Readings from Last 3 Encounters:   02/08/21 114/70   02/07/20 122/60   07/19/19 124/76    Wt Readings from Last 3 Encounters:   02/08/21 74.8 kg (165 lb)   02/07/20 76.7 kg (169 lb 3.2 oz)   07/19/19 73.5 kg (162 lb)                  Patient Active Problem List   Diagnosis     Hyperlipidemia with target LDL less than 130     Vertigo     Ganglion cyst     AK (actinic keratosis)     Raynaud's phenomenon without gangrene     Foreign body of right ear, initial encounter     Past Surgical History:   Procedure Laterality Date     HERNIA REPAIR         Social History     Tobacco Use     Smoking status: Never Smoker     Smokeless tobacco: Never Used   Substance Use Topics     Alcohol use: Yes     Family History   Problem Relation Age of Onset     Hypertension Father      Cardiovascular Father 42        heart attack     Prostate Cancer Brother          Current Outpatient Medications   Medication Sig Dispense Refill     aspirin (ASA) 81 MG tablet Take 1 tablet (81 mg) by mouth daily 90 tablet 3     atorvastatin (LIPITOR) 40 MG tablet Take 0.5 tablets (20 mg) by mouth daily 90 tablet 3     No Known Allergies  Recent Labs   Lab Test 02/07/20  0946 02/06/19  1549 12/22/17  1026 11/09/16  1421 02/27/13  0000 02/27/13   A1C  --   --   --   --   --  6.0   LDL 70 71 74 84   < > 72   HDL 62 50 51 58   < > 46   TRIG 89 109 84 79   < > 84   ALT 61 40  --  25   < > 18   CR 1.16 1.16  --  1.25   < >  --    GFRESTIMATED 66 67  --  59*   < >  --    GFRESTBLACK 77 77  --  71   < >  --    POTASSIUM 4.2 4.2  --  4.3   < >  --    TSH  --   --   --  1.49  --   --     < > = values in this interval not displayed.        Reviewed and updated as needed this visit by clinical staff  Tobacco  Allergies  Meds   Med Hx  Surg Hx  Fam Hx  Soc Hx        Reviewed and updated as needed this visit by Provider                    Review of Systems   Constitutional: Negative for chills and fever.  "  HENT: Negative for congestion, ear pain, hearing loss and sore throat.    Eyes: Negative for pain and visual disturbance.   Respiratory: Negative for cough and shortness of breath.    Cardiovascular: Negative for chest pain, palpitations and peripheral edema.   Gastrointestinal: Negative for abdominal pain, constipation, diarrhea, heartburn, hematochezia and nausea.   Genitourinary: Negative for discharge, dysuria, frequency, genital sores, hematuria, impotence and urgency.   Musculoskeletal: Negative for arthralgias, joint swelling and myalgias.   Skin: Negative for rash.   Neurological: Negative for dizziness, weakness, headaches and paresthesias.   Psychiatric/Behavioral: Negative for mood changes. The patient is not nervous/anxious.          OBJECTIVE:   /70   Pulse 68   Temp 96.6  F (35.9  C) (Temporal)   Resp 14   Ht 1.78 m (5' 10.08\")   Wt 74.8 kg (165 lb)   SpO2 95%   BMI 23.62 kg/m      Physical Exam  GENERAL: healthy, alert and no distress  EYES: Eyes grossly normal to inspection, PERRL and conjunctivae and sclerae normal  HENT: ear canals and TM's normal, nose and mouth without ulcers or lesions  NECK: no adenopathy, no asymmetry, masses, or scars and thyroid normal to palpation  RESP: lungs clear to auscultation - no rales, rhonchi or wheezes  CV: regular rate and rhythm, normal S1 S2, no S3 or S4, no murmur, click or rub, no peripheral edema and peripheral pulses strong  ABDOMEN: soft, nontender, no hepatosplenomegaly, no masses and bowel sounds normal  MS: tenderness and stiffness of left ring finger c/w trigger finger.  SKIN: no suspicious lesions or rashes  NEURO: Normal strength and tone, mentation intact and speech normal  PSYCH: mentation appears normal, affect normal/bright    Diagnostic Test Results:  Labs reviewed in Epic    ASSESSMENT/PLAN:   1. Routine general medical examination at a health care facility  Reviewed recommended screenings and ordered appropriate testing for pt's " "risks and per pt's request(s).   - Lipid panel reflex to direct LDL Fasting  - Comprehensive metabolic panel (BMP + Alb, Alk Phos, ALT, AST, Total. Bili, TP)    2. Trigger ring finger of left hand  Clinically, most consistent with this.  Recommended trial of splinting and anti-inflammatories for a couple weeks.  If not improving, will refer to orthopedics for further evaluation and possible injection versus surgery.    3. Hyperlipidemia with target LDL less than 130  Currently controlled.  Will continue current regimen check monitoring labs.  - atorvastatin (LIPITOR) 40 MG tablet; Take 0.5 tablets (20 mg) by mouth daily  Dispense: 90 tablet; Refill: 3    4. Need for vaccination  Immunized.  - ZOSTER VACCINE RECOMBINANT ADJUVANTED IM NJX    Portions of this note were completed using Dragon dictation software.  Although reviewed, there may be typographical and other inadvertent errors that remain.       Patient has been advised of split billing requirements and indicates understanding: Yes  COUNSELING:   Reviewed preventive health counseling, as reflected in patient instructions       Regular exercise       Healthy diet/nutrition       Prostate cancer screening       Osteoporosis prevention/bone health       The 10-year ASCVD risk score ([place] SERENA Jr., et al., 2013) is: 6.9%    Values used to calculate the score:      Age: 65 years      Sex: Male      Is Non- : No      Diabetic: No      Tobacco smoker: No      Systolic Blood Pressure: 114 mmHg      Is BP treated: No      HDL Cholesterol: 62 mg/dL      Total Cholesterol: 150 mg/dL    Estimated body mass index is 23.62 kg/m  as calculated from the following:    Height as of this encounter: 1.78 m (5' 10.08\").    Weight as of this encounter: 74.8 kg (165 lb).         He reports that he has never smoked. He has never used smokeless tobacco.      Counseling Resources:  ATP IV Guidelines  Pooled Cohorts Equation Calculator  FRAX Risk Assessment  ICSI " Preventive Guidelines  Dietary Guidelines for Americans, 2010  USDA's MyPlate  ASA Prophylaxis  Lung CA Screening    Tenzin Alves MD, MD  [facility]

## 2021-02-05 ASSESSMENT — ENCOUNTER SYMPTOMS
ARTHRALGIAS: 0
MYALGIAS: 0
HEARTBURN: 0
DYSURIA: 0
NERVOUS/ANXIOUS: 0
CHILLS: 0
PARESTHESIAS: 0
FREQUENCY: 0
PALPITATIONS: 0
HEMATURIA: 0
HEMATOCHEZIA: 0
SHORTNESS OF BREATH: 0
DIARRHEA: 0
SORE THROAT: 0
COUGH: 0
FEVER: 0
NAUSEA: 0
ABDOMINAL PAIN: 0
WEAKNESS: 0
EYE PAIN: 0
JOINT SWELLING: 0
DIZZINESS: 0
CONSTIPATION: 0
HEADACHES: 0

## 2021-02-08 ENCOUNTER — OFFICE VISIT (OUTPATIENT)
Dept: FAMILY MEDICINE | Facility: OTHER | Age: 66
End: 2021-02-08
Payer: COMMERCIAL

## 2021-02-08 VITALS
RESPIRATION RATE: 14 BRPM | OXYGEN SATURATION: 95 % | HEIGHT: 70 IN | SYSTOLIC BLOOD PRESSURE: 114 MMHG | HEART RATE: 68 BPM | TEMPERATURE: 96.6 F | DIASTOLIC BLOOD PRESSURE: 70 MMHG | BODY MASS INDEX: 23.62 KG/M2 | WEIGHT: 165 LBS

## 2021-02-08 DIAGNOSIS — Z00.00 ROUTINE GENERAL MEDICAL EXAMINATION AT A HEALTH CARE FACILITY: Primary | ICD-10-CM

## 2021-02-08 DIAGNOSIS — Z23 NEED FOR VACCINATION: ICD-10-CM

## 2021-02-08 DIAGNOSIS — M65.342 TRIGGER RING FINGER OF LEFT HAND: ICD-10-CM

## 2021-02-08 DIAGNOSIS — E78.5 HYPERLIPIDEMIA WITH TARGET LDL LESS THAN 130: ICD-10-CM

## 2021-02-08 LAB
ALBUMIN SERPL-MCNC: 3.8 G/DL (ref 3.4–5)
ALP SERPL-CCNC: 66 U/L (ref 40–150)
ALT SERPL W P-5'-P-CCNC: 28 U/L (ref 0–70)
ANION GAP SERPL CALCULATED.3IONS-SCNC: 4 MMOL/L (ref 3–14)
AST SERPL W P-5'-P-CCNC: 13 U/L (ref 0–45)
BILIRUB SERPL-MCNC: 0.6 MG/DL (ref 0.2–1.3)
BUN SERPL-MCNC: 15 MG/DL (ref 7–30)
CALCIUM SERPL-MCNC: 9 MG/DL (ref 8.5–10.1)
CHLORIDE SERPL-SCNC: 107 MMOL/L (ref 94–109)
CHOLEST SERPL-MCNC: 130 MG/DL
CO2 SERPL-SCNC: 29 MMOL/L (ref 20–32)
CREAT SERPL-MCNC: 1.06 MG/DL (ref 0.66–1.25)
GFR SERPL CREATININE-BSD FRML MDRD: 73 ML/MIN/{1.73_M2}
GLUCOSE SERPL-MCNC: 101 MG/DL (ref 70–99)
HDLC SERPL-MCNC: 57 MG/DL
LDLC SERPL CALC-MCNC: 60 MG/DL
NONHDLC SERPL-MCNC: 73 MG/DL
POTASSIUM SERPL-SCNC: 3.7 MMOL/L (ref 3.4–5.3)
PROT SERPL-MCNC: 7.3 G/DL (ref 6.8–8.8)
SODIUM SERPL-SCNC: 140 MMOL/L (ref 133–144)
TRIGL SERPL-MCNC: 67 MG/DL

## 2021-02-08 PROCEDURE — 90750 HZV VACC RECOMBINANT IM: CPT | Performed by: FAMILY MEDICINE

## 2021-02-08 PROCEDURE — 99396 PREV VISIT EST AGE 40-64: CPT | Mod: 25 | Performed by: FAMILY MEDICINE

## 2021-02-08 PROCEDURE — 80053 COMPREHEN METABOLIC PANEL: CPT | Performed by: FAMILY MEDICINE

## 2021-02-08 PROCEDURE — 90471 IMMUNIZATION ADMIN: CPT | Performed by: FAMILY MEDICINE

## 2021-02-08 PROCEDURE — 36415 COLL VENOUS BLD VENIPUNCTURE: CPT | Performed by: FAMILY MEDICINE

## 2021-02-08 PROCEDURE — 99213 OFFICE O/P EST LOW 20 MIN: CPT | Mod: 25 | Performed by: FAMILY MEDICINE

## 2021-02-08 PROCEDURE — 80061 LIPID PANEL: CPT | Performed by: FAMILY MEDICINE

## 2021-02-08 RX ORDER — ATORVASTATIN CALCIUM 40 MG/1
20 TABLET, FILM COATED ORAL DAILY
Qty: 90 TABLET | Refills: 3 | Status: SHIPPED | OUTPATIENT
Start: 2021-02-08 | End: 2022-03-04

## 2021-02-08 ASSESSMENT — ENCOUNTER SYMPTOMS
MYALGIAS: 0
NAUSEA: 0
ABDOMINAL PAIN: 0
NERVOUS/ANXIOUS: 0
ARTHRALGIAS: 0
CONSTIPATION: 0
SHORTNESS OF BREATH: 0
DYSURIA: 0
COUGH: 0
SORE THROAT: 0
HEADACHES: 0
HEMATURIA: 0
HEARTBURN: 0
DIARRHEA: 0
PALPITATIONS: 0
FEVER: 0
CHILLS: 0
JOINT SWELLING: 0
DIZZINESS: 0
PARESTHESIAS: 0
FREQUENCY: 0
EYE PAIN: 0
HEMATOCHEZIA: 0
WEAKNESS: 0

## 2021-02-08 ASSESSMENT — MIFFLIN-ST. JEOR: SCORE: 1545.94

## 2021-02-08 NOTE — PATIENT INSTRUCTIONS
Try splinting your finger at night.  Take ibuprofen 400-800 mg three times/day for 1-2 weeks.    If not improving, we can get you in with ortho for an injection and/or surgery.    We'll let you know your lab results as soon as we can.     Contact us or return if questions or concerns.     Have a nice day!    Dr. Alves       Preventive Health Recommendations  Male Ages 50 - 64    Yearly exam:             See your health care provider every year in order to  o   Review health changes.   o   Discuss preventive care.    o   Review your medicines if your doctor has prescribed any.     Have a cholesterol test every 5 years, or more frequently if you are at risk for high cholesterol/heart disease.     Have a diabetes test (fasting glucose) every three years. If you are at risk for diabetes, you should have this test more often.     Have a colonoscopy at age 50, or have a yearly FIT test (stool test). These exams will check for colon cancer.      Talk with your health care provider about whether or not a prostate cancer screening test (PSA) is right for you.    You should be tested each year for STDs (sexually transmitted diseases), if you re at risk.     Shots: Get a flu shot each year. Get a tetanus shot every 10 years.     Nutrition:    Eat at least 5 servings of fruits and vegetables daily.     Eat whole-grain bread, whole-wheat pasta and brown rice instead of white grains and rice.     Get adequate Calcium and Vitamin D.     Lifestyle    Exercise for at least 150 minutes a week (30 minutes a day, 5 days a week). This will help you control your weight and prevent disease.     Limit alcohol to one drink per day.     No smoking.     Wear sunscreen to prevent skin cancer.     See your dentist every six months for an exam and cleaning.     See your eye doctor every 1 to 2 years.

## 2021-02-08 NOTE — RESULT ENCOUNTER NOTE
Evan,    All of your labs were normal for you.  Very modest elevation of your blood sugar.    Have a nice day!    Dr. Alves

## 2021-02-12 DIAGNOSIS — I73.00 RAYNAUD'S PHENOMENON WITHOUT GANGRENE: Primary | ICD-10-CM

## 2021-02-12 RX ORDER — ASPIRIN 81 MG/1
TABLET, COATED ORAL
Qty: 90 TABLET | Refills: 0 | Status: SHIPPED | OUTPATIENT
Start: 2021-02-12 | End: 2021-06-28

## 2021-02-12 NOTE — TELEPHONE ENCOUNTER
"Requested Prescriptions   Pending Prescriptions Disp Refills     ASPIRIN LOW DOSE 81 MG EC tablet [Pharmacy Med Name: ASPIRIN LOW DOSE 81MG TBEC] 90 tablet 0     Sig: TAKE ONE TABLET BY MOUTH ONCE DAILY       Analgesics (Non-Narcotic Tylenol and ASA Only) Passed - 2/12/2021  1:01 PM        Passed - Recent (12 mo) or future (30 days) visit within the authorizing provider's specialty     Patient has had an office visit with the authorizing provider or a provider within the authorizing providers department within the previous 12 mos or has a future within next 30 days. See \"Patient Info\" tab in inbasket, or \"Choose Columns\" in Meds & Orders section of the refill encounter.              Passed - Patient is age 20 years or older     If ASA is flagged for ages under 20 years old. Forward to provider for confirmation Ryes Syndrome is not a concern.              Passed - Medication is active on med list           Prescription approved per [facility] Refill Protocol.    Melvin Anglin RN, BSN    "

## 2021-04-09 ENCOUNTER — IMMUNIZATION (OUTPATIENT)
Dept: NURSING | Facility: CLINIC | Age: 66
End: 2021-04-09
Payer: COMMERCIAL

## 2021-04-09 PROCEDURE — 0001A PR COVID VAC PFIZER DIL RECON 30 MCG/0.3 ML IM: CPT

## 2021-04-09 PROCEDURE — 91300 PR COVID VAC PFIZER DIL RECON 30 MCG/0.3 ML IM: CPT

## 2021-04-30 ENCOUNTER — IMMUNIZATION (OUTPATIENT)
Dept: NURSING | Facility: CLINIC | Age: 66
End: 2021-04-30
Attending: INTERNAL MEDICINE
Payer: COMMERCIAL

## 2021-04-30 PROCEDURE — 91300 PR COVID VAC PFIZER DIL RECON 30 MCG/0.3 ML IM: CPT

## 2021-04-30 PROCEDURE — 0002A PR COVID VAC PFIZER DIL RECON 30 MCG/0.3 ML IM: CPT

## 2021-06-10 ENCOUNTER — ALLIED HEALTH/NURSE VISIT (OUTPATIENT)
Dept: FAMILY MEDICINE | Facility: OTHER | Age: 66
End: 2021-06-10
Payer: COMMERCIAL

## 2021-06-10 DIAGNOSIS — Z23 NEED FOR VACCINATION: Primary | ICD-10-CM

## 2021-06-10 PROCEDURE — 90471 IMMUNIZATION ADMIN: CPT

## 2021-06-10 PROCEDURE — 90750 HZV VACC RECOMBINANT IM: CPT

## 2021-06-10 NOTE — PROGRESS NOTES
Prior to immunization administration, verified patients identity using patient s name and date of birth. Please see Immunization Activity for additional information.     Screening Questionnaire for Adult Immunization    Are you sick today?   No   Do you have allergies to medications, food, a vaccine component or latex?   No   Have you ever had a serious reaction after receiving a vaccination?   No   Do you have a long-term health problem with heart, lung, kidney, or metabolic disease (e.g., diabetes), asthma, a blood disorder, no spleen, complement component deficiency, a cochlear implant, or a spinal fluid leak?  Are you on long-term aspirin therapy?   No   Do you have cancer, leukemia, HIV/AIDS, or any other immune system problem?   No   Do you have a parent, brother, or sister with an immune system problem?   No   In the past 3 months, have you taken medications that affect  your immune system, such as prednisone, other steroids, or anticancer drugs; drugs for the treatment of rheumatoid arthritis, Crohn s disease, or psoriasis; or have you had radiation treatments?   No   Have you had a seizure, or a brain or other nervous system problem?   No   During the past year, have you received a transfusion of blood or blood    products, or been given immune (gamma) globulin or antiviral drug?   No   For women: Are you pregnant or is there a chance you could become       pregnant during the next month?   No   Have you received any vaccinations in the past 4 weeks?   No     Immunization questionnaire answers were all negative.        Per orders of Dr. Alves, injection of Shingrix given by Priscilla Espino. Patient instructed to remain in clinic for 15 minutes afterwards, and to report any adverse reaction to me immediately.       Screening performed by Priscilla Espino on 6/10/2021 at 8:31 AM.

## 2021-06-27 DIAGNOSIS — I73.00 RAYNAUD'S PHENOMENON WITHOUT GANGRENE: ICD-10-CM

## 2021-06-28 RX ORDER — ASPIRIN 81 MG/1
TABLET, DELAYED RELEASE ORAL
Qty: 90 TABLET | Refills: 0 | Status: SHIPPED | OUTPATIENT
Start: 2021-06-28 | End: 2021-09-29

## 2021-06-28 NOTE — TELEPHONE ENCOUNTER
Prescription approved per [facility] Refill Protocol.  Manny White RN, BSN  [facility]  June 28, 2021

## 2021-07-02 ENCOUNTER — TRANSFERRED RECORDS (OUTPATIENT)
Dept: HEALTH INFORMATION MANAGEMENT | Facility: CLINIC | Age: 66
End: 2021-07-02

## 2021-09-28 DIAGNOSIS — I73.00 RAYNAUD'S PHENOMENON WITHOUT GANGRENE: ICD-10-CM

## 2021-09-29 RX ORDER — ASPIRIN 81 MG/1
TABLET, DELAYED RELEASE ORAL
Qty: 90 TABLET | Refills: 1 | Status: SHIPPED | OUTPATIENT
Start: 2021-09-29 | End: 2022-03-04

## 2021-10-03 ENCOUNTER — HEALTH MAINTENANCE LETTER (OUTPATIENT)
Age: 66
End: 2021-10-03

## 2022-03-04 ENCOUNTER — OFFICE VISIT (OUTPATIENT)
Dept: FAMILY MEDICINE | Facility: OTHER | Age: 67
End: 2022-03-04
Payer: COMMERCIAL

## 2022-03-04 ENCOUNTER — TELEPHONE (OUTPATIENT)
Dept: GASTROENTEROLOGY | Facility: CLINIC | Age: 67
End: 2022-03-04

## 2022-03-04 VITALS
DIASTOLIC BLOOD PRESSURE: 68 MMHG | SYSTOLIC BLOOD PRESSURE: 120 MMHG | HEART RATE: 77 BPM | HEIGHT: 70 IN | OXYGEN SATURATION: 97 % | WEIGHT: 167 LBS | TEMPERATURE: 97.5 F | BODY MASS INDEX: 23.91 KG/M2

## 2022-03-04 DIAGNOSIS — Z80.42 FAMILY HISTORY OF PROSTATE CANCER: ICD-10-CM

## 2022-03-04 DIAGNOSIS — E78.5 HYPERLIPIDEMIA WITH TARGET LDL LESS THAN 130: ICD-10-CM

## 2022-03-04 DIAGNOSIS — Z12.5 SCREENING FOR PROSTATE CANCER: ICD-10-CM

## 2022-03-04 DIAGNOSIS — Z13.6 SCREENING FOR AAA (ABDOMINAL AORTIC ANEURYSM): ICD-10-CM

## 2022-03-04 DIAGNOSIS — Z23 NEED FOR VACCINATION: ICD-10-CM

## 2022-03-04 DIAGNOSIS — Z00.00 ENCOUNTER FOR MEDICARE ANNUAL WELLNESS EXAM: Primary | ICD-10-CM

## 2022-03-04 DIAGNOSIS — Z12.11 SCREEN FOR COLON CANCER: ICD-10-CM

## 2022-03-04 LAB
ALBUMIN SERPL-MCNC: 3.7 G/DL (ref 3.4–5)
ALP SERPL-CCNC: 66 U/L (ref 40–150)
ALT SERPL W P-5'-P-CCNC: 30 U/L (ref 0–70)
ANION GAP SERPL CALCULATED.3IONS-SCNC: 3 MMOL/L (ref 3–14)
AST SERPL W P-5'-P-CCNC: 16 U/L (ref 0–45)
BILIRUB SERPL-MCNC: 0.8 MG/DL (ref 0.2–1.3)
BUN SERPL-MCNC: 14 MG/DL (ref 7–30)
CALCIUM SERPL-MCNC: 9 MG/DL (ref 8.5–10.1)
CHLORIDE BLD-SCNC: 108 MMOL/L (ref 94–109)
CHOLEST SERPL-MCNC: 137 MG/DL
CO2 SERPL-SCNC: 30 MMOL/L (ref 20–32)
CREAT SERPL-MCNC: 1.18 MG/DL (ref 0.66–1.25)
FASTING STATUS PATIENT QL REPORTED: YES
GFR SERPL CREATININE-BSD FRML MDRD: 68 ML/MIN/1.73M2
GLUCOSE BLD-MCNC: 108 MG/DL (ref 70–99)
HDLC SERPL-MCNC: 56 MG/DL
LDLC SERPL CALC-MCNC: 64 MG/DL
NONHDLC SERPL-MCNC: 81 MG/DL
POTASSIUM BLD-SCNC: 4 MMOL/L (ref 3.4–5.3)
PROT SERPL-MCNC: 7.1 G/DL (ref 6.8–8.8)
PSA SERPL-MCNC: 2.34 UG/L (ref 0–4)
SODIUM SERPL-SCNC: 141 MMOL/L (ref 133–144)
TRIGL SERPL-MCNC: 85 MG/DL

## 2022-03-04 PROCEDURE — G0402 INITIAL PREVENTIVE EXAM: HCPCS | Performed by: FAMILY MEDICINE

## 2022-03-04 PROCEDURE — 80053 COMPREHEN METABOLIC PANEL: CPT | Performed by: FAMILY MEDICINE

## 2022-03-04 PROCEDURE — 90662 IIV NO PRSV INCREASED AG IM: CPT | Performed by: FAMILY MEDICINE

## 2022-03-04 PROCEDURE — G0009 ADMIN PNEUMOCOCCAL VACCINE: HCPCS | Performed by: FAMILY MEDICINE

## 2022-03-04 PROCEDURE — G0008 ADMIN INFLUENZA VIRUS VAC: HCPCS | Performed by: FAMILY MEDICINE

## 2022-03-04 PROCEDURE — 80061 LIPID PANEL: CPT | Performed by: FAMILY MEDICINE

## 2022-03-04 PROCEDURE — G0103 PSA SCREENING: HCPCS | Performed by: FAMILY MEDICINE

## 2022-03-04 PROCEDURE — 90732 PPSV23 VACC 2 YRS+ SUBQ/IM: CPT | Performed by: FAMILY MEDICINE

## 2022-03-04 PROCEDURE — 36415 COLL VENOUS BLD VENIPUNCTURE: CPT | Performed by: FAMILY MEDICINE

## 2022-03-04 PROCEDURE — 99213 OFFICE O/P EST LOW 20 MIN: CPT | Mod: 25 | Performed by: FAMILY MEDICINE

## 2022-03-04 RX ORDER — ATORVASTATIN CALCIUM 40 MG/1
20 TABLET, FILM COATED ORAL DAILY
Qty: 90 TABLET | Refills: 3 | Status: SHIPPED | OUTPATIENT
Start: 2022-03-04 | End: 2023-04-19

## 2022-03-04 ASSESSMENT — ENCOUNTER SYMPTOMS
PALPITATIONS: 0
WEAKNESS: 0
SHORTNESS OF BREATH: 0
FEVER: 0
PARESTHESIAS: 0
SORE THROAT: 0
ARTHRALGIAS: 0
DIZZINESS: 0
ABDOMINAL PAIN: 0
HEARTBURN: 0
NERVOUS/ANXIOUS: 0
COUGH: 0
CHILLS: 0
HEMATOCHEZIA: 0
DYSURIA: 0
JOINT SWELLING: 0
MYALGIAS: 0
NAUSEA: 0
DIARRHEA: 0
HEMATURIA: 0
FREQUENCY: 0
EYE PAIN: 0
CONSTIPATION: 0
HEADACHES: 0

## 2022-03-04 ASSESSMENT — PAIN SCALES - GENERAL: PAINLEVEL: MILD PAIN (3)

## 2022-03-04 ASSESSMENT — ACTIVITIES OF DAILY LIVING (ADL): CURRENT_FUNCTION: NO ASSISTANCE NEEDED

## 2022-03-04 NOTE — PROGRESS NOTES
"SUBJECTIVE:   Evan Rios is a 66 year old male who presents for Preventive Visit.    Are you in the first 12 months of your Medicare coverage?  Yes,  Visual Acuity:  Right Eye: 20/20   Left Eye: 20/20  Both Eyes: 20/20    Healthy Habits:     In general, how would you rate your overall health?  Excellent    Frequency of exercise:  4-5 days/week    Duration of exercise:  30-45 minutes    Do you usually eat at least 4 servings of fruit and vegetables a day, include whole grains    & fiber and avoid regularly eating high fat or \"junk\" foods?  Yes    Taking medications regularly:  Yes    Medication side effects:  None    Ability to successfully perform activities of daily living:  No assistance needed    Home Safety:  No safety concerns identified    Hearing Impairment:  Difficulty following a conversation in a noisy restaurant or crowded room    In the past 6 months, have you been bothered by leaking of urine?  No    In general, how would you rate your overall mental or emotional health?  Excellent      PHQ-2 Total Score: 0    Additional concerns today:  Yes    This is his first Medicare Wellness check.    Has started vitamin D supplementation. Also takes Emergen-C.  Thinks this  Has helped to keep him healthy.      Denies problems with his Lipitor.      Do you feel safe in your environment? Yes    Have you ever done Advance Care Planning? (For example, a Health Directive, POLST, or a discussion with a medical provider or your loved ones about your wishes): Yes, patient states has an Advance Care Planning document and will bring a copy to the clinic.       Fall risk  Fallen 2 or more times in the past year?: No  Any fall with injury in the past year?: Yes  Timed Up and Go Test (>13.5 is fall risk; contact physician) : 7    Cognitive Screening   1) Repeat 3 items (Leader, Season, Table)    2) Clock draw: NORMAL  3) 3 item recall: Recalls 3 objects  Results: 3 items recalled: COGNITIVE IMPAIRMENT LESS " LIKELY    Mini-CogTM Copyright VIKTOR Souza. Licensed by the author for use in [facility]; reprinted with permission (emily@[facility]). All rights reserved.          Reviewed and updated as needed this visit by clinical staff   Tobacco  Allergies  Meds   Med Hx  Surg Hx  Fam Hx  Soc Hx        Reviewed and updated as needed this visit by Provider                 Social History     Tobacco Use     Smoking status: Never Smoker     Smokeless tobacco: Never Used   Substance Use Topics     Alcohol use: Yes         Alcohol Use 3/4/2022   Prescreen: >3 drinks/day or >7 drinks/week? No   Prescreen: >3 drinks/day or >7 drinks/week? -             Current providers sharing in care for this patient include:   Patient Care Team:  Tenzin Alves MD as PCP - General (Family Practice)  Tenzin Alves MD as Assigned PCP    The following health maintenance items are reviewed in Epic and correct as of today:  Health Maintenance Due   Topic Date Due     FALL RISK ASSESSMENT  Never done     Pneumococcal Vaccine: 65+ Years (1 of 1 - PPSV23) Never done     INFLUENZA VACCINE (1) 09/01/2021     COLORECTAL CANCER SCREENING  01/01/2022     LIPID  02/08/2022     BP Readings from Last 3 Encounters:   03/04/22 120/68   02/08/21 114/70   02/07/20 122/60    Wt Readings from Last 3 Encounters:   03/04/22 75.8 kg (167 lb)   02/08/21 74.8 kg (165 lb)   02/07/20 76.7 kg (169 lb 3.2 oz)                  Patient Active Problem List   Diagnosis     Hyperlipidemia with target LDL less than 130     Vertigo     Ganglion cyst     AK (actinic keratosis)     Raynaud's phenomenon without gangrene     Foreign body of right ear, initial encounter     Past Surgical History:   Procedure Laterality Date     HERNIA REPAIR         Social History     Tobacco Use     Smoking status: Never Smoker     Smokeless tobacco: Never Used   Substance Use Topics     Alcohol use: Yes     Family History   Problem Relation Age of Onset     Hypertension  "Father      Cardiovascular Father 42        heart attack     Prostate Cancer Brother          Current Outpatient Medications   Medication Sig Dispense Refill     aspirin (ASA) 81 MG tablet Take 1 tablet (81 mg) by mouth daily 90 tablet 3     atorvastatin (LIPITOR) 40 MG tablet Take 0.5 tablets (20 mg) by mouth daily 90 tablet 3     No Known Allergies  Recent Labs   Lab Test 02/08/21  0749 02/07/20  0946 02/06/19  1549 12/22/17  1026 11/09/16  1421   LDL 60 70 71   < > 84   HDL 57 62 50   < > 58   TRIG 67 89 109   < > 79   ALT 28 61 40  --  25   CR 1.06 1.16 1.16  --  1.25   GFRESTIMATED 73 66 67  --  59*   GFRESTBLACK 85 77 77  --  71   POTASSIUM 3.7 4.2 4.2  --  4.3   TSH  --   --   --   --  1.49    < > = values in this interval not displayed.      Pneumonia Vaccine:Adults age 65+ who have not received previous Pneumovax (PPSV23) or PCV13 as an adult: Should first be given PCV13 AND then should be given PPSV23 6-12 months after PCV13        Review of Systems   Constitutional: Negative for chills and fever.   HENT: Negative for congestion, ear pain, hearing loss and sore throat.    Eyes: Negative for pain and visual disturbance.   Respiratory: Negative for cough and shortness of breath.    Cardiovascular: Negative for chest pain, palpitations and peripheral edema.   Gastrointestinal: Negative for abdominal pain, constipation, diarrhea, heartburn, hematochezia and nausea.   Genitourinary: Negative for dysuria, frequency, genital sores, hematuria, impotence, penile discharge and urgency.   Musculoskeletal: Negative for arthralgias, joint swelling and myalgias.   Skin: Negative for rash.   Neurological: Negative for dizziness, weakness, headaches and paresthesias.   Psychiatric/Behavioral: Negative for mood changes. The patient is not nervous/anxious.          OBJECTIVE:   /68   Pulse 77   Temp 97.5  F (36.4  C) (Temporal)   Ht 1.77 m (5' 9.69\")   Wt 75.8 kg (167 lb)   SpO2 97%   BMI 24.18 kg/m   Estimated " "body mass index is 24.18 kg/m  as calculated from the following:    Height as of this encounter: 1.77 m (5' 9.69\").    Weight as of this encounter: 75.8 kg (167 lb).  Physical Exam  GENERAL: healthy, alert and no distress  NECK: no adenopathy, no asymmetry, masses, or scars and thyroid normal to palpation  RESP: lungs clear to auscultation - no rales, rhonchi or wheezes  CV: regular rate and rhythm, normal S1 S2, no S3 or S4, no murmur, click or rub, no peripheral edema and peripheral pulses strong  ABDOMEN: soft, nontender, no hepatosplenomegaly, no masses and bowel sounds normal  MS: no gross musculoskeletal defects noted, no edema    Diagnostic Test Results:  Labs reviewed in Epic  No results found for this or any previous visit (from the past 24 hour(s)).  No results found for any visits on 03/04/22.    ASSESSMENT / PLAN:       ICD-10-CM    1. Encounter for Medicare annual wellness exam  Z00.00    2. Hyperlipidemia with target LDL less than 130  E78.5 Lipid panel reflex to direct LDL Fasting     Comprehensive metabolic panel (BMP + Alb, Alk Phos, ALT, AST, Total. Bili, TP)     atorvastatin (LIPITOR) 40 MG tablet     Lipid panel reflex to direct LDL Fasting     Comprehensive metabolic panel (BMP + Alb, Alk Phos, ALT, AST, Total. Bili, TP)   3. Screen for colon cancer  Z12.11 Adult Gastro Ref - Procedure Only   4. Family history of prostate cancer  Z80.42 PSA, screen     PSA, screen   5. Screening for prostate cancer  Z12.5 PSA, screen     PSA, screen   6. Need for vaccination  Z23 IN FLU VACCINE, INCREASED ANTIGEN, PRESV FREE ([id number])     PPSV23, IM/SUBQ (2+ YRS) - [id number]   7. Screening for AAA (abdominal aortic aneurysm)  Z13.6 Abdominal Aortic Aneurysm Screening/Tracking     1.  Reviewed recommended screenings and ordered appropriate testing for pt's risks and per pt's request(s).   2.  Clinically controlled.  Will continue current regimen and check monitoring labs.  3.  Screening ordered.  4, 5.  " "Screening PSA ordered.  6.  Immunized.  7.  Screening evaluation performed.    Portions of this note were completed using Dragon dictation software.  Although reviewed, there may be typographical and other inadvertent errors that remain.         COUNSELING:  Reviewed preventive health counseling, as reflected in patient instructions       Consider AAA screening for ages 65-75 and smoking history       Regular exercise       Healthy diet/nutrition       Fall risk prevention       Immunizations    Vaccinated for: Influenza and Pneumococcal             Aspirin prophylaxis        HIV screening for high risk patient       Colon cancer screening       Prostate cancer screening       The 10-year ASCVD risk score (Jose Carlos LYONS Jr., et al., 2013) is: 7.9%    Values used to calculate the score:      Age: 66 years      Sex: Male      Is Non- : No      Diabetic: No      Tobacco smoker: No      Systolic Blood Pressure: 120 mmHg      Is BP treated: No      HDL Cholesterol: 57 mg/dL      Total Cholesterol: 130 mg/dL    Estimated body mass index is 24.18 kg/m  as calculated from the following:    Height as of this encounter: 1.77 m (5' 9.69\").    Weight as of this encounter: 75.8 kg (167 lb).        He reports that he has never smoked. He has never used smokeless tobacco.      Appropriate preventive services were discussed with this patient, including applicable screening as appropriate for cardiovascular disease, diabetes, osteopenia/osteoporosis, and glaucoma.  As appropriate for age/gender, discussed screening for colorectal cancer, prostate cancer, breast cancer, and cervical cancer. Checklist reviewing preventive services available has been given to the patient.    Reviewed patients plan of care and provided an AVS. The Intermediate Care Plan ( asthma action plan, low back pain action plan, and migraine action plan) for Evan meets the Care Plan requirement. This Care Plan has been established and reviewed " with the Patient.    Counseling Resources:  ATP IV Guidelines  Pooled Cohorts Equation Calculator  Breast Cancer Risk Calculator  Breast Cancer: Medication to Reduce Risk  FRAX Risk Assessment  ICSI Preventive Guidelines  Dietary Guidelines for Americans, 2010  USDA's MyPlate  ASA Prophylaxis  Lung CA Screening    Tenzin Alves MD, MD  [facility]    Identified Health Risks:    The patient was provided with written information regarding signs of hearing loss.  He is at risk for falling and has been provided with information to reduce the risk of falling at home.

## 2022-03-04 NOTE — TELEPHONE ENCOUNTER
Screening Questions  BlueKIND OF PREP RedLOCATION [review exclusion criteria] GreenSEDATION TYPE    1. Have you had a positive covid test in the last 90 days? NO     2. Are you active on mychart? YES    3. What insurance is in the chart? [employer]ARE     3.  Ordering/Referring Provider:     4. BMI 22.2 [BMI OVER 40-EXTENDED PREP]  If greater than 40 review exclusion criteria [PAC APPT IF @ [facility]]    5.  Respiratory Screening :  [If yes to any of the following HOSPITAL setting only]     Do you use daily home oxygen? NO    Do you have mod to severe Obstructive Sleep Apnea? NO  [OKAY @ [facility] SH PH RI]   Do you have Pulmonary Hypertension? NO     Do you have UNCONTROLLED asthma? NO      6. Have you had a heart or lung transplant? NO      7. Are you currently on dialysis? NO [ If yes, G-PREP & HOSPITAL setting only]     8. Do you have chronic kidney disease? NO [ If yes, G-PREP ]    9. Have you had a stroke or Transient ischemic attack (TIA) within 6 months? NO (If yes, do not schedule at [facility])    10. In the past 6 months, have you had any heart related issues including cardiomyopathy or heart attack? NO        If yes, did it require cardiac stenting or other implantable device?       11. Do you have any implantable devices in your body (pacemaker, defib, LVAD)? NO (If yes, schedule at [facility])    12. Do you take nitroglycerin? NO   If yes, how often?   (if yes, HOSPITAL setting ONLY)    13. Are you currently taking any blood thinners? NO   [IF YES, INFORM PATIENT TO FOLLOW UP W/ ORDERING PROVIDER FOR BRIDGING INSTRUCTIONS]     14. Are you a diabetic? NO   [ If yes, G-PREP ]    15. [FEMALES] Are you currently pregnant?     If yes, how many weeks?     16. Are you taking any prescription pain medications on a routine schedule?  NO  [ If yes, EXTENDED PREP.] [If yes, MAC]    17. Do you have any chemical dependencies such as alcohol, street drugs, or methadone?  NO [If yes, MAC]    18. Do you have any history of  post-traumatic stress syndrome, severe anxiety or history of psychosis?  NO  [If yes, MAC]    19. Do you transfer independently?  YES    20.  Do you have any issues with constipation?  NO  [ If yes, EXTENDED PREP.]    21. Preferred LOCAL Pharmacy for Pre Prescription     [facility] [place] - [address] [facility] [place] [address]  Scheduling Details      Caller : NEENA  (Please ask for phone number if not scheduled by patient)    Type of Procedure Scheduled: COLONOSCOPY  Which Colonoscopy Prep was Sent?: MIRALAX  KHORUTS CF PATIENTS & GROEN'S PATIENTS NEEDS EXTENDED PREP  Surgeon: THANH  Date of Procedure: 04/14/2022  Location: [facility] Type: MAC  Conscious Sedation- Needs  for 6 hours after the procedure  MAC/General-Needs  for 24 hours after procedure    Pre-op Required at [facility], [place], [place] and OR for MAC sedation: NO  (advise patient they will need a pre-op prior to procedure -)      Informed patient they will need an adult  YES  Cannot take any type of public or medical transportation alone    Pre-Procedure Covid test to be completed at [facility] or Externally: YES    Confirmed Nurse will call to complete assessment YES    Additional comments: NA

## 2022-03-04 NOTE — PATIENT INSTRUCTIONS
We'll let you know your lab results as soon as we can.     Contact us or return if questions or concerns.     Have a nice day!    Dr. Alves     Patient Education   Personalized Prevention Plan  You are due for the preventive services outlined below.  Your care team is available to assist you in scheduling these services.  If you have already completed any of these items, please share that information with your care team to update in your medical record.  Health Maintenance Due   Topic Date Due     FALL RISK ASSESSMENT  Never done     Pneumococcal Vaccine (1 of 1 - PPSV23) Never done     Flu Vaccine (1) 09/01/2021     Colorectal Cancer Screening  01/01/2022     Cholesterol Lab  02/08/2022       Signs of Hearing Loss      Hearing much better with one ear can be a sign of hearing loss.   Hearing loss is a problem shared by many people. In fact, it is one of the most common health problems, particularly as people age. Most people age 65 and older have some hearing loss. By age 80, almost everyone does. Hearing loss often occurs slowly over the years. So you may not realize your hearing has gotten worse.  Have your hearing checked  Call your healthcare provider if you:    Have to strain to hear normal conversation    Have to watch other people s faces very carefully to follow what they re saying    Need to ask people to repeat what they ve said    Often misunderstand what people are saying    Turn the volume of the television or radio up so high that others complain    Feel that people are mumbling when they re talking to you    Find that the effort to hear leaves you feeling tired and irritated    Notice, when using the phone, that you hear better with one ear than the other  [employer] last reviewed this educational content on 1/1/2020 2000-2021 [employer]. All rights reserved. This information is not intended as a substitute for professional medical care. Always follow your healthcare professional's  instructions.          Preventing Falls at Home  A person can fall for many reasons. Older adults may fall because reaction time slows down as we age. Your muscles and joints may get stiff, weak, or less flexible because of illness, medicines, or a physical condition.   Other health problems that make falls more likely include:     Arthritis    Dizziness or lightheadedness when you stand up (orthostatic hypotension)    History of a stroke    Dizziness    Anemia    Certain medicines taken for mental illness or to control blood pressure.    Problems with balance or gait    Bladder or urinary problems    History of falling    Changes in vision (vision impairment)    Changes in thinking skills and memory (cognitive impairment)  Injuries from a fall can include serious injuries such as broken bones, dislocated joints, internal bleeding and cuts. Injuries like these can limit your independence.   Prevention tips  To help prevent falls and fall-related injuries, follow the tips below.    Floors  To make floors safer:     Put nonskid pads under area rugs.    Remove small rugs.    Replace worn floor coverings.    Tack carpets firmly to each step on carpeted stairs. Put nonskid strips on the edges of uncarpeted stairs.    Keep floors and stairs free of clutter and cords.    Arrange furniture so there are clear pathways.    Clean up any spills right away.  Bathrooms    To make bathrooms safer:     Install grab bars in the tub or shower.    Apply nonskid strips or put a nonskid rubber mat in the tub or shower.    Sit on a bath chair to bathe.    Use bathmats with nonskid backing.  Lighting  To improve visibility in your home:      Keep a flashlight in each room. Or put a lamp next to the bed within easy reach.    Put nightlights in the bedrooms, hallways, kitchen, and bathrooms.    Make sure all stairways have good lighting.    Take your time when going up and down stairs.    Put handrails on both sides of stairs and in  walkways for more support. To prevent injury to your wrist or arm, don t use handrails to pull yourself up.    Install grab bars to pull yourself up.    Move or rearrange items that you use often. This will make them easier to find or reach.    Look at your home to find any safety hazards. Especially look at doorways, walkways, and the driveway. Remove or repair any safety problems that you find.  Other changes to make    Look around to find any safety hazards. Look closely at doorways, walkways, and the driveway. Remove or repair any safety problems that you find.    Wear shoes that fit well.    Take your time when going up and down stairs.    Put handrails on both sides of stairs and in walkways for more support. To prevent injury to your wrist or arm, don t use handrails to pull yourself up.    Install grab bars wherever needed to pull yourself up.    Arrange items that you use often. This will make them easier to find or reach.    [employer] last reviewed this educational content on 3/1/2020    6952-9522 [employer]. All rights reserved. This information is not intended as a substitute for professional medical care. Always follow your healthcare professional's instructions.

## 2022-03-05 DIAGNOSIS — Z11.59 ENCOUNTER FOR SCREENING FOR OTHER VIRAL DISEASES: Primary | ICD-10-CM

## 2022-03-07 NOTE — RESULT ENCOUNTER NOTE
Evan,    All of your labs were normal for you except  your blood sugar.    Your blood sugar is in the prediabetic range.  Can try to prevent this from getting worse by exercising regularly and controlling weight and diet.    Have a nice day!    Dr. Alves

## 2022-04-11 ENCOUNTER — LAB (OUTPATIENT)
Dept: LAB | Facility: OTHER | Age: 67
End: 2022-04-11
Payer: COMMERCIAL

## 2022-04-11 DIAGNOSIS — Z11.59 ENCOUNTER FOR SCREENING FOR OTHER VIRAL DISEASES: ICD-10-CM

## 2022-04-11 PROCEDURE — U0003 INFECTIOUS AGENT DETECTION BY NUCLEIC ACID (DNA OR RNA); SEVERE ACUTE RESPIRATORY SYNDROME CORONAVIRUS 2 (SARS-COV-2) (CORONAVIRUS DISEASE [COVID-19]), AMPLIFIED PROBE TECHNIQUE, MAKING USE OF HIGH THROUGHPUT TECHNOLOGIES AS DESCRIBED BY CMS-2020-01-R: HCPCS

## 2022-04-11 PROCEDURE — U0005 INFEC AGEN DETEC AMPLI PROBE: HCPCS

## 2022-04-12 LAB — SARS-COV-2 RNA RESP QL NAA+PROBE: NEGATIVE

## 2022-04-14 ENCOUNTER — ANESTHESIA EVENT (OUTPATIENT)
Dept: GASTROENTEROLOGY | Facility: CLINIC | Age: 67
End: 2022-04-14
Payer: COMMERCIAL

## 2022-04-14 ENCOUNTER — HOSPITAL ENCOUNTER (OUTPATIENT)
Facility: CLINIC | Age: 67
Discharge: HOME | End: 2022-04-14
Attending: SURGERY | Admitting: SURGERY
Payer: COMMERCIAL

## 2022-04-14 ENCOUNTER — ANESTHESIA (OUTPATIENT)
Dept: GASTROENTEROLOGY | Facility: CLINIC | Age: 67
End: 2022-04-14
Payer: COMMERCIAL

## 2022-04-14 VITALS
WEIGHT: 167 LBS | OXYGEN SATURATION: 99 % | RESPIRATION RATE: 16 BRPM | HEART RATE: 211 BPM | TEMPERATURE: 97.8 F | HEIGHT: 70 IN | BODY MASS INDEX: 23.91 KG/M2 | SYSTOLIC BLOOD PRESSURE: 126 MMHG | DIASTOLIC BLOOD PRESSURE: 97 MMHG

## 2022-04-14 LAB — COLONOSCOPY: NORMAL

## 2022-04-14 PROCEDURE — 258N000003 HC RX IP 258 OP 636: Performed by: SURGERY

## 2022-04-14 PROCEDURE — 370N000017 HC ANESTHESIA TECHNICAL FEE, PER MIN: Performed by: SURGERY

## 2022-04-14 PROCEDURE — 45385 COLONOSCOPY W/LESION REMOVAL: CPT | Mod: PT | Performed by: SURGERY

## 2022-04-14 PROCEDURE — 45385 COLONOSCOPY W/LESION REMOVAL: CPT | Performed by: SURGERY

## 2022-04-14 PROCEDURE — 250N000009 HC RX 250: Performed by: NURSE ANESTHETIST, CERTIFIED REGISTERED

## 2022-04-14 PROCEDURE — 250N000011 HC RX IP 250 OP 636: Performed by: NURSE ANESTHETIST, CERTIFIED REGISTERED

## 2022-04-14 PROCEDURE — 88305 TISSUE EXAM BY PATHOLOGIST: CPT | Mod: TC | Performed by: SURGERY

## 2022-04-14 PROCEDURE — 250N000009 HC RX 250: Performed by: SURGERY

## 2022-04-14 RX ORDER — PROPOFOL 10 MG/ML
INJECTION, EMULSION INTRAVENOUS PRN
Status: DISCONTINUED | OUTPATIENT
Start: 2022-04-14 | End: 2022-04-14

## 2022-04-14 RX ORDER — NALOXONE HYDROCHLORIDE 0.4 MG/ML
0.2 INJECTION, SOLUTION INTRAMUSCULAR; INTRAVENOUS; SUBCUTANEOUS
Status: DISCONTINUED | OUTPATIENT
Start: 2022-04-14 | End: 2022-04-14 | Stop reason: HOSPADM

## 2022-04-14 RX ORDER — ONDANSETRON 4 MG/1
4 TABLET, ORALLY DISINTEGRATING ORAL EVERY 6 HOURS PRN
Status: DISCONTINUED | OUTPATIENT
Start: 2022-04-14 | End: 2022-04-14 | Stop reason: HOSPADM

## 2022-04-14 RX ORDER — NALOXONE HYDROCHLORIDE 0.4 MG/ML
0.4 INJECTION, SOLUTION INTRAMUSCULAR; INTRAVENOUS; SUBCUTANEOUS
Status: DISCONTINUED | OUTPATIENT
Start: 2022-04-14 | End: 2022-04-14 | Stop reason: HOSPADM

## 2022-04-14 RX ORDER — FLUMAZENIL 0.1 MG/ML
0.2 INJECTION, SOLUTION INTRAVENOUS
Status: DISCONTINUED | OUTPATIENT
Start: 2022-04-14 | End: 2022-04-14 | Stop reason: HOSPADM

## 2022-04-14 RX ORDER — SODIUM CHLORIDE, SODIUM LACTATE, POTASSIUM CHLORIDE, CALCIUM CHLORIDE 600; 310; 30; 20 MG/100ML; MG/100ML; MG/100ML; MG/100ML
INJECTION, SOLUTION INTRAVENOUS CONTINUOUS
Status: DISCONTINUED | OUTPATIENT
Start: 2022-04-14 | End: 2022-04-14 | Stop reason: HOSPADM

## 2022-04-14 RX ORDER — PROPOFOL 10 MG/ML
INJECTION, EMULSION INTRAVENOUS CONTINUOUS PRN
Status: DISCONTINUED | OUTPATIENT
Start: 2022-04-14 | End: 2022-04-14

## 2022-04-14 RX ORDER — ONDANSETRON 2 MG/ML
4 INJECTION INTRAMUSCULAR; INTRAVENOUS
Status: DISCONTINUED | OUTPATIENT
Start: 2022-04-14 | End: 2022-04-14

## 2022-04-14 RX ORDER — PROCHLORPERAZINE MALEATE 5 MG
5 TABLET ORAL EVERY 6 HOURS PRN
Status: DISCONTINUED | OUTPATIENT
Start: 2022-04-14 | End: 2022-04-14 | Stop reason: HOSPADM

## 2022-04-14 RX ORDER — LIDOCAINE 40 MG/G
CREAM TOPICAL
Status: DISCONTINUED | OUTPATIENT
Start: 2022-04-14 | End: 2022-04-14 | Stop reason: HOSPADM

## 2022-04-14 RX ORDER — ONDANSETRON 2 MG/ML
4 INJECTION INTRAMUSCULAR; INTRAVENOUS EVERY 6 HOURS PRN
Status: DISCONTINUED | OUTPATIENT
Start: 2022-04-14 | End: 2022-04-14 | Stop reason: HOSPADM

## 2022-04-14 RX ORDER — LIDOCAINE HYDROCHLORIDE 20 MG/ML
INJECTION, SOLUTION INFILTRATION; PERINEURAL PRN
Status: DISCONTINUED | OUTPATIENT
Start: 2022-04-14 | End: 2022-04-14

## 2022-04-14 RX ADMIN — LIDOCAINE HYDROCHLORIDE 0.1 ML: 10 INJECTION, SOLUTION EPIDURAL; INFILTRATION; INTRACAUDAL; PERINEURAL at 08:32

## 2022-04-14 RX ADMIN — LIDOCAINE HYDROCHLORIDE 60 MG: 20 INJECTION, SOLUTION INFILTRATION; PERINEURAL at 09:09

## 2022-04-14 RX ADMIN — PROPOFOL 200 MCG/KG/MIN: 10 INJECTION, EMULSION INTRAVENOUS at 09:10

## 2022-04-14 RX ADMIN — SODIUM CHLORIDE, POTASSIUM CHLORIDE, SODIUM LACTATE AND CALCIUM CHLORIDE: 600; 310; 30; 20 INJECTION, SOLUTION INTRAVENOUS at 08:32

## 2022-04-14 RX ADMIN — PROPOFOL 50 MG: 10 INJECTION, EMULSION INTRAVENOUS at 09:10

## 2022-04-14 NOTE — ANESTHESIA POSTPROCEDURE EVALUATION
Patient: Evan Rios    Procedure: Procedure(s):  COLONOSCOPY, FLEXIBLE, WITH LESION REMOVAL USING SNARE       Anesthesia Type:  MAC    Note:  Disposition: Outpatient   Postop Pain Control: Uneventful            Sign Out: Well controlled pain   PONV: No   Neuro/Psych: Uneventful            Sign Out: Acceptable/Baseline neuro status   Airway/Respiratory: Uneventful            Sign Out: Acceptable/Baseline resp. status   CV/Hemodynamics: Uneventful            Sign Out: Acceptable CV status   Other NRE: NONE   DID A NON-ROUTINE EVENT OCCUR? No    Event details/Postop Comments:  Pt was happy with anesthesia care.  No complications.  I will follow up with the pt if needed.           Last vitals:  Vitals Value Taken Time   /84 04/14/22 0935   Temp     Pulse 80 04/14/22 0932   Resp 14 04/14/22 0935   SpO2 98 % 04/14/22 0944   Vitals shown include unvalidated device data.    Electronically Signed By: IZZY Sanabria CRNA  April 14, 2022  9:45 AM

## 2022-04-14 NOTE — DISCHARGE INSTRUCTIONS
[facility]    Home Care Following Endoscopy          Activity:  You have just undergone an endoscopic procedure usually performed with conscious sedation.  Do not work or operate machinery (including a car) for at least 12 hours.    I encourage you to walk and attempt to pass this air as soon as possible.    Diet:  Return to the diet you were on before your procedure but eat lightly for the first 12-24 hours.  Drink plenty of water.  Resume any regular medications unless otherwise advised by your physician.  Please begin any new medication prescribed as a result of your procedure as directed by your physician.   If you had any biopsy or polyp removed please refrain from aspirin or aspirin products for 2 days.  If on Coumadin please restart as instructed by your physician.   Pain:  You may take Tylenol as needed for pain.  Expected Recovery:  You can expect some mild abdominal fullness and/or discomfort due to the air used to inflate your intestinal tract.     Call Your Physician if You Have:  After Colonoscopy:  Worsening persisting abdominal pain which is worse with activity.  Fevers (>101 degrees F), chills or shakes.  Passage of continued blood with bowel movements.   Any questions or concerns about your recovery, please call [phone] or after hours [phone] Nurse Advice Line.    Follow-up Care:  You did have polyps/biopsy tissue sample(s) removed.  The polyps/biopsy tissue sample(s) will be sent to pathology.  You should receive letter in your My Chart with your results within 1-2 weeks. If you do not participate in My Chart a physical letter will come in the mail in 2-3 weeks.  Please call if you have not received a notification of your results.  If asked to return to clinic please make an appointment 1 week after your procedure.  Call [phone].

## 2022-04-14 NOTE — LETTER
Evan Rios  [id number]   [place] [address]    April 20, 2022      Dear Evan,  This letter is to inform you of the results of your pathology report from your colonoscopy.  Your pathology report was:  Final Diagnosis   A.  Sigmoid colon polyp:  - Tubular adenoma.  - Negative for high grade dysplasia or malignancy.   These are benign polyps. These types of polyps do carry a small risk of developing into a cancer over time if not removed. Yours were completely removed at the time of your colonoscopy. You should have another surveillance colonoscopy in 5 years.  If you have further questions please don t hesitate to call our clinic at [phone].   Sincerely,     Juancarlos Landa, DO

## 2022-04-14 NOTE — ANESTHESIA PREPROCEDURE EVALUATION
Anesthesia Pre-Procedure Evaluation    Patient: Evan Rios   MRN: [id number] : 1955        Procedure : Procedure(s):  COLONOSCOPY          Past Medical History:   Diagnosis Date     Hyperlipidemia LDL goal < 130       Past Surgical History:   Procedure Laterality Date     HERNIA REPAIR        No Known Allergies   Social History     Tobacco Use     Smoking status: Never Smoker     Smokeless tobacco: Never Used   Substance Use Topics     Alcohol use: Yes      Wt Readings from Last 1 Encounters:   22 75.8 kg (167 lb)        Anesthesia Evaluation   Pt has had prior anesthetic. Type: General.    No history of anesthetic complications       ROS/MED HX  ENT/Pulmonary:  - neg pulmonary ROS     Neurologic:  - neg neurologic ROS     Cardiovascular: Comment:  EKG NSR  Raynaud's phenomenon without gangrene    (+) Dyslipidemia -----    METS/Exercise Tolerance: >4 METS    Hematologic:  - neg hematologic  ROS     Musculoskeletal: Comment: AK (actinic keratosis)      GI/Hepatic:  - neg GI/hepatic ROS     Renal/Genitourinary:  - neg Renal ROS     Endo:  - neg endo ROS     Psychiatric/Substance Use:  - neg psychiatric ROS     Infectious Disease:  - neg infectious disease ROS     Malignancy:  - neg malignancy ROS     Other:  - neg other ROS          Physical Exam    Airway        Mallampati: II   TM distance: > 3 FB   Neck ROM: full   Mouth opening: > 3 cm    Respiratory Devices and Support         Dental  no notable dental history         Cardiovascular   cardiovascular exam normal          Pulmonary   pulmonary exam normal                OUTSIDE LABS:  CBC:   Lab Results   Component Value Date    WBC 6.1 2020    WBC 6.5 10/09/2015    HGB 15.6 2020    HGB 14.9 10/09/2015    HCT 48.4 2020    HCT 43.5 10/09/2015     2020     10/09/2015     BMP:   Lab Results   Component Value Date     2022     2021    POTASSIUM 4.0 2022    POTASSIUM 3.7  02/08/2021    CHLORIDE 108 03/04/2022    CHLORIDE 107 02/08/2021    CO2 30 03/04/2022    CO2 29 02/08/2021    BUN 14 03/04/2022    BUN 15 02/08/2021    CR 1.18 03/04/2022    CR 1.06 02/08/2021     (H) 03/04/2022     (H) 02/08/2021     COAGS: No results found for: PTT, INR, FIBR  POC: No results found for: BGM, HCG, HCGS  HEPATIC:   Lab Results   Component Value Date    ALBUMIN 3.7 03/04/2022    PROTTOTAL 7.1 03/04/2022    ALT 30 03/04/2022    AST 16 03/04/2022    ALKPHOS 66 03/04/2022    BILITOTAL 0.8 03/04/2022     OTHER:   Lab Results   Component Value Date    A1C 6.0 02/27/2013    RODRIGUEZ 9.0 03/04/2022    TSH 1.49 11/09/2016       Anesthesia Plan    ASA Status:  2   NPO Status:  NPO Appropriate    Anesthesia Type: MAC.   Induction: Intravenous, Propofol.   Maintenance: TIVA.        Consents         - Extended Intubation/Ventilatory Support Discussed: No.      - Patient is DNR/DNI Status: No    Use of blood products discussed: No .     Postoperative Care       PONV prophylaxis: Background Propofol Infusion, Ondansetron (or other 5HT-3)     Comments:    Other Comments: The risks and benefits of anesthesia, and the alternatives where applicable, have been discussed with the patient, and they wish to proceed.            Rosalva Maxwell

## 2022-04-14 NOTE — ANESTHESIA CARE TRANSFER NOTE
Patient: Evan Rios    Procedure: Procedure(s):  COLONOSCOPY, FLEXIBLE, WITH LESION REMOVAL USING SNARE       Diagnosis: Screen for colon cancer [Z12.11]  Diagnosis Additional Information: No value filed.    Anesthesia Type:   MAC     Note:    Oropharynx: oropharynx clear of all foreign objects and spontaneously breathing  Level of Consciousness: drowsy  Oxygen Supplementation: face mask  Level of Supplemental Oxygen (L/min / FiO2): 6  Independent Airway: airway patency satisfactory and stable  Dentition: dentition unchanged  Vital Signs Stable: post-procedure vital signs reviewed and stable  Report to RN Given: handoff report given  Patient transferred to: Phase II    Handoff Report: Identifed the Patient, Identified the Reponsible Provider, Reviewed the pertinent medical history, Discussed the surgical course, Reviewed Intra-OP anesthesia mangement and issues during anesthesia, Set expectations for post-procedure period and Allowed opportunity for questions and acknowledgement of understanding      Vitals:  Vitals Value Taken Time   /84 04/14/22 0932   Temp     Pulse 80 04/14/22 0932   Resp     SpO2 98 % 04/14/22 0934   Vitals shown include unvalidated device data.    Electronically Signed By: Rosalva Maxwell  April 14, 2022  9:35 AM

## 2022-04-14 NOTE — H&P
Patient seen for Endoscopy    HPI:  Patient is a 66 year old male here for endoscopy. Not taking blood thinning medications. No MI or CVA history. No issues with previous sedation. No recent acute illness.    Review Of Systems    Skin: negative  Ears/Nose/Throat: negative  Respiratory: No shortness of breath, dyspnea on exertion, cough, or hemoptysis  Cardiovascular: negative  Gastrointestinal: negative  Genitourinary: negative  Musculoskeletal: negative  Neurologic: negative  Hematologic/Lymphatic/Immunologic: negative  Endocrine: negative      Past Medical History:   Diagnosis Date     Hyperlipidemia LDL goal < 130        Past Surgical History:   Procedure Laterality Date     HERNIA REPAIR         Family History   Problem Relation Age of Onset     Hypertension Father      Cardiovascular Father 42        heart attack     Prostate Cancer Brother        Social History     Socioeconomic History     Marital status:      Spouse name: Not on file     Number of children: Not on file     Years of education: Not on file     Highest education level: Not on file   Occupational History     Not on file   Tobacco Use     Smoking status: Never Smoker     Smokeless tobacco: Never Used   Vaping Use     Vaping Use: Never used   Substance and Sexual Activity     Alcohol use: Yes     Drug use: No     Sexual activity: Yes     Partners: Female   Other Topics Concern     Parent/sibling w/ CABG, MI or angioplasty before 65F 55M? No   Social History Narrative     Not on file     Social Determinants of Health     Financial Resource Strain: Not on file   Food Insecurity: Not on file   Transportation Needs: Not on file   Physical Activity: Not on file   Stress: Not on file   Social Connections: Not on file   Intimate Partner Violence: Not on file   Housing Stability: Not on file       No current outpatient medications on file.       Medications and history reviewed    Physical exam:  Vitals: BP (!) 135/100   Temp 97.8  F (36.6  C)  "(Oral)   Resp 16   Ht 1.77 m (5' 9.69\")   Wt 75.8 kg (167 lb)   SpO2 96%   BMI 24.18 kg/m    BMI= Body mass index is 24.18 kg/m .    Constitutional: Healthy, alert, non-distressed   Head: Normo-cephalic, atraumatic, no lesions, masses or tenderness   Cardiovascular: RRR, no new murmurs, +S1, +S2 heart sounds, no clicks, rubs or gallops   Respiratory: CTAB, no rales, rhonchi or wheezing, equal chest rise, good respiratory effort   Gastrointestinal: Soft, non-tender, non distended, no rebound rigidity or guarding, no masses or hernias palpated   : Deferred  Musculoskeletal: Moves all extremities, normal  strength, no deformities noted   Skin: No suspicious lesions or rashes   Psychiatric: Mentation appears normal, affect appropriate   Hematologic/Lymphatic/Immunologic: Normal cervical and supraclavicular lymph nodes   Patient able to get up on table without difficulty.    Labs show:  No results found for this or any previous visit (from the past 24 hour(s)).    Assessment: Endoscopy  Plan: Pt cleared for anesthesia for proposed procedure.    Juancarlos Landa, DO      "

## 2022-04-18 LAB
PATH REPORT.COMMENTS IMP SPEC: NORMAL
PATH REPORT.COMMENTS IMP SPEC: NORMAL
PATH REPORT.FINAL DX SPEC: NORMAL
PATH REPORT.GROSS SPEC: NORMAL
PATH REPORT.MICROSCOPIC SPEC OTHER STN: NORMAL
PATH REPORT.RELEVANT HX SPEC: NORMAL
PHOTO IMAGE: NORMAL

## 2022-04-18 PROCEDURE — 88305 TISSUE EXAM BY PATHOLOGIST: CPT | Mod: 26 | Performed by: PATHOLOGY

## 2022-07-02 NOTE — LETTER
3/14/2018         RE: Evan Rios  [id number] [facility] [address]        Dear Colleague,    Thank you for referring your patient, Evan Rios, to the [facility]. Please see a copy of my visit note below.    ENT Consultation    Evan Rios is a 62 year old male who is seen in consultation at the request of Dr.Danial Alves.      History of Present Illness - Evan Rios is a 62 year old male with tinnitus. He feels he has had ringing in his ears for 40-50 years but the ringing increases about 6-8 months ago. Patient has not had a recent audiogram. Patient was in the [employer] and exposed to loud engine noises.     Past Medical History -   Past Medical History:   Diagnosis Date     Hyperlipidemia LDL goal < 130        Current Medications -   Current Outpatient Prescriptions:      atorvastatin (LIPITOR) 40 MG tablet, Take 0.5 tablets (20 mg) by mouth daily, Disp: 90 tablet, Rfl: 3     aspirin 81 MG tablet, Take 1 tablet (81 mg) by mouth daily, Disp: 90 tablet, Rfl: 3    Allergies - No Known Allergies    Social History -   Social History     Social History     Marital status:      Spouse name: N/A     Number of children: N/A     Years of education: N/A     Social History Main Topics     Smoking status: Never Smoker     Smokeless tobacco: Never Used     Alcohol use Yes     Drug use: No     Sexual activity: Yes     Partners: Female     Other Topics Concern     Parent/Sibling W/ Cabg, Mi Or Angioplasty Before 65f 55m? No     Social History Narrative       Family History -   Family History   Problem Relation Age of Onset     Hypertension Father      Cardiovascular Father 42     heart attack       Review of Systems - As per HPI and PMHx, otherwise review of system review of the head and neck negative.    Physical Exam  Pulse 62  Wt 76.2 kg (168 lb)  SpO2 98%  BMI 24.63 kg/m2  BMI: Body mass index is 24.63 kg/(m^2).    General - The patient is well nourished and well developed,  and appears to have good nutritional status.  Alert and oriented to person and place, answers questions and cooperates with examination appropriately.    SKIN - No suspicious lesions or rashes.  Respiration - No respiratory distress.  Head and Face - Normocephalic and atraumatic, with no gross asymmetry noted of the contour of the facial features.  The facial nerve is intact, with strong symmetric movements.    Voice and Breathing - The patient was breathing comfortably without the use of accessory muscles. The patients voice was clear and strong, and had appropriate pitch and quality.    Ears - Bilateral pinna and EACs with normal appearing overlying skin. Tympanic membrane intact with good mobility on pneumatic otoscopy bilaterally. Bony landmarks of the ossicular chain are normal. The tympanic membranes are normal in appearance. No retraction, perforation, or masses.  No fluid or purulence was seen in the external canal or the middle ear.     Eyes - Extraocular movements intact.  Sclera were not icteric or injected, conjunctiva were pink and moist.    Mouth - Examination of the oral cavity showed pink, healthy oral mucosa. No lesions or ulcerations noted.  The tongue was mobile and midline, and the dentition were in good condition.      Throat - The walls of the oropharynx were smooth, pink, moist, symmetric, and had no lesions or ulcerations.  The tonsillar pillars and soft palate were symmetric.  The uvula was midline on elevation.    Neck - Normal midline excursion of the laryngotracheal complex during swallowing.  Full range of motion on passive movement.  Palpation of the occipital, submental, submandibular, internal jugular chain, and supraclavicular nodes did not demonstrate any abnormal lymph nodes or masses.  The carotid pulse was palpable bilaterally.  Palpation of the thyroid was soft and smooth, with no nodules or goiter appreciated.  The trachea was mobile and midline.    Nose - External contour is  symmetric, no gross deflection or scars.  Nasal mucosa is pink and moist with no abnormal mucus.  The septum was midline and non-obstructive, turbinates of normal size and position.  No polyps, masses, or purulence noted on examination.    Neuro - Nonfocal neuro exam is normal, CN 2 through 12 intact, normal gait and muscle tone.      Performed in clinic today:  Audiologic Studies - An audiogram and tympanogram were performed today as part of the evaluation and personally reviewed. The tympanogram shows Type A curves on the right and Type C curves on the left, with normal canal volumes and middle ear pressures.  The audiogram showed down sloping hearing loss in both ears with normal hearing in the low tones, but moderate to severe hearing loss in the high frequencies.      A/P - Evan Rios is a 62 year old male with sloping hearing loss and tinnitus. I recommended that patient get hearing aids. I suggested that he [facility]. I also recommended that patient protect his hearing. He should return for a audiogram in 1 year.       This document serves as a record of the services and decisions personally performed and made by Dr. Kendall Tinoco MD. It was created on his behalf by Danelle Fountain, a trained medical scribe. The creation of this document is based the provider's statements to the medical scribe.  Danelle Fountain 12:09 PM 3/14/2018    Provider:   The information in this document, created by the medical scribe for me, accurately reflects the services I personally performed and the decisions made by me. I have reviewed and approved this document for accuracy prior to leaving the patient care area.  Dr. Kendall Tnioco MD 12:09 PM 3/14/2018    Kendall Tinoco MD.      Again, thank you for allowing me to participate in the care of your patient.        Sincerely,        Kendall Tinoco MD, MD     Family History of psychiatric diagnoses requiring hospitalization/History of abuse/trauma

## 2022-09-10 ENCOUNTER — HEALTH MAINTENANCE LETTER (OUTPATIENT)
Age: 67
End: 2022-09-10

## 2023-04-18 ASSESSMENT — ENCOUNTER SYMPTOMS
HEADACHES: 0
MYALGIAS: 0
PARESTHESIAS: 0
DYSURIA: 0
FEVER: 0
HEARTBURN: 0
SORE THROAT: 0
DIZZINESS: 0
PALPITATIONS: 0
NAUSEA: 0
HEMATURIA: 0
ARTHRALGIAS: 0
NERVOUS/ANXIOUS: 0
FREQUENCY: 0
JOINT SWELLING: 0
ABDOMINAL PAIN: 0
DIARRHEA: 0
WEAKNESS: 0
SHORTNESS OF BREATH: 0
CHILLS: 0
HEMATOCHEZIA: 0
EYE PAIN: 0
COUGH: 0
CONSTIPATION: 0

## 2023-04-18 ASSESSMENT — ACTIVITIES OF DAILY LIVING (ADL): CURRENT_FUNCTION: NO ASSISTANCE NEEDED

## 2023-04-19 ENCOUNTER — OFFICE VISIT (OUTPATIENT)
Dept: FAMILY MEDICINE | Facility: OTHER | Age: 68
End: 2023-04-19
Payer: COMMERCIAL

## 2023-04-19 VITALS
HEART RATE: 70 BPM | SYSTOLIC BLOOD PRESSURE: 124 MMHG | BODY MASS INDEX: 24.44 KG/M2 | WEIGHT: 165 LBS | OXYGEN SATURATION: 99 % | HEIGHT: 69 IN | TEMPERATURE: 97.4 F | DIASTOLIC BLOOD PRESSURE: 84 MMHG | RESPIRATION RATE: 16 BRPM

## 2023-04-19 DIAGNOSIS — L57.0 AK (ACTINIC KERATOSIS): ICD-10-CM

## 2023-04-19 DIAGNOSIS — L81.4 SOLAR LENTIGO: ICD-10-CM

## 2023-04-19 DIAGNOSIS — Z00.00 ENCOUNTER FOR MEDICARE ANNUAL WELLNESS EXAM: Primary | ICD-10-CM

## 2023-04-19 DIAGNOSIS — Z12.5 SPECIAL SCREENING FOR MALIGNANT NEOPLASM OF PROSTATE: ICD-10-CM

## 2023-04-19 DIAGNOSIS — Z23 NEED FOR VACCINATION: ICD-10-CM

## 2023-04-19 DIAGNOSIS — Z80.42 FAMILY HISTORY OF PROSTATE CANCER: ICD-10-CM

## 2023-04-19 DIAGNOSIS — E78.5 HYPERLIPIDEMIA WITH TARGET LDL LESS THAN 130: ICD-10-CM

## 2023-04-19 LAB
ALBUMIN SERPL BCG-MCNC: 4.1 G/DL (ref 3.5–5.2)
ALP SERPL-CCNC: 81 U/L (ref 40–129)
ALT SERPL W P-5'-P-CCNC: 22 U/L (ref 10–50)
ANION GAP SERPL CALCULATED.3IONS-SCNC: 8 MMOL/L (ref 7–15)
AST SERPL W P-5'-P-CCNC: 18 U/L (ref 10–50)
BILIRUB SERPL-MCNC: 0.4 MG/DL
BUN SERPL-MCNC: 15.2 MG/DL (ref 8–23)
CALCIUM SERPL-MCNC: 9.1 MG/DL (ref 8.8–10.2)
CHLORIDE SERPL-SCNC: 102 MMOL/L (ref 98–107)
CHOLEST SERPL-MCNC: 131 MG/DL
CREAT SERPL-MCNC: 1.15 MG/DL (ref 0.67–1.17)
DEPRECATED HCO3 PLAS-SCNC: 30 MMOL/L (ref 22–29)
GFR SERPL CREATININE-BSD FRML MDRD: 70 ML/MIN/1.73M2
GLUCOSE SERPL-MCNC: 105 MG/DL (ref 70–99)
HDLC SERPL-MCNC: 46 MG/DL
LDLC SERPL CALC-MCNC: 68 MG/DL
NONHDLC SERPL-MCNC: 85 MG/DL
POTASSIUM SERPL-SCNC: 3.8 MMOL/L (ref 3.4–5.3)
PROT SERPL-MCNC: 6.9 G/DL (ref 6.4–8.3)
PSA SERPL DL<=0.01 NG/ML-MCNC: 1.93 NG/ML (ref 0–4.5)
SODIUM SERPL-SCNC: 140 MMOL/L (ref 136–145)
TRIGL SERPL-MCNC: 83 MG/DL

## 2023-04-19 PROCEDURE — G0009 ADMIN PNEUMOCOCCAL VACCINE: HCPCS | Performed by: FAMILY MEDICINE

## 2023-04-19 PROCEDURE — G0103 PSA SCREENING: HCPCS | Performed by: FAMILY MEDICINE

## 2023-04-19 PROCEDURE — 99214 OFFICE O/P EST MOD 30 MIN: CPT | Mod: 25 | Performed by: FAMILY MEDICINE

## 2023-04-19 PROCEDURE — 80053 COMPREHEN METABOLIC PANEL: CPT | Performed by: FAMILY MEDICINE

## 2023-04-19 PROCEDURE — 80061 LIPID PANEL: CPT | Performed by: FAMILY MEDICINE

## 2023-04-19 PROCEDURE — G0438 PPPS, INITIAL VISIT: HCPCS | Performed by: FAMILY MEDICINE

## 2023-04-19 PROCEDURE — 36415 COLL VENOUS BLD VENIPUNCTURE: CPT | Performed by: FAMILY MEDICINE

## 2023-04-19 PROCEDURE — 90677 PCV20 VACCINE IM: CPT | Performed by: FAMILY MEDICINE

## 2023-04-19 RX ORDER — CETIRIZINE HYDROCHLORIDE 10 MG/1
10 TABLET ORAL DAILY
COMMUNITY

## 2023-04-19 RX ORDER — ATORVASTATIN CALCIUM 40 MG/1
20 TABLET, FILM COATED ORAL DAILY
Qty: 90 TABLET | Refills: 3 | Status: SHIPPED | OUTPATIENT
Start: 2023-04-19 | End: 2024-04-26

## 2023-04-19 ASSESSMENT — ENCOUNTER SYMPTOMS
DIZZINESS: 0
DYSURIA: 0
EYE PAIN: 0
SORE THROAT: 0
HEADACHES: 0
COUGH: 0
ABDOMINAL PAIN: 0
JOINT SWELLING: 0
HEARTBURN: 0
FEVER: 0
PALPITATIONS: 0
HEMATURIA: 0
WEAKNESS: 0
SHORTNESS OF BREATH: 0
PARESTHESIAS: 0
HEMATOCHEZIA: 0
MYALGIAS: 0
CONSTIPATION: 0
FREQUENCY: 0
NERVOUS/ANXIOUS: 0
ARTHRALGIAS: 0
NAUSEA: 0
CHILLS: 0
DIARRHEA: 0

## 2023-04-19 ASSESSMENT — ACTIVITIES OF DAILY LIVING (ADL): CURRENT_FUNCTION: NO ASSISTANCE NEEDED

## 2023-04-19 ASSESSMENT — PAIN SCALES - GENERAL: PAINLEVEL: NO PAIN (0)

## 2023-04-19 NOTE — PATIENT INSTRUCTIONS
Let's have you schedule a lesion removal/biopsy when convenient for you.    We'll let you know your lab results as soon as we can.     Contact us or return if questions or concerns.    Have a nice day!    Dr. Alves    Patient Education   Personalized Prevention Plan  You are due for the preventive services outlined below.  Your care team is available to assist you in scheduling these services.  If you have already completed any of these items, please share that information with your care team to update in your medical record.  Health Maintenance Due   Topic Date Due    COVID-19 Vaccine (4 - Booster for Pfizer series) 01/10/2022    Flu Vaccine (1) 09/01/2022    Annual Wellness Visit  03/04/2023    Cholesterol Lab  03/04/2023    ANNUAL REVIEW OF HM ORDERS  03/04/2023    Pneumococcal Vaccine (2 - PCV) 03/04/2023       Signs of Hearing Loss  Hearing loss is a problem shared by many people. In fact, it's one of the most common health problems, particularly as people age. Most people aged 65 and older have some hearing loss. By age 80, almost everyone does. Hearing loss often occurs slowly over the years. So, you may not realize your hearing has gotten worse.   When sudden hearing loss occurs, it's important to contact your healthcare provider right away. Your provider will do a medical exam and a hearing exam as soon as possible. This is to help find the cause and type of your sudden hearing loss. Based on your diagnosis, your healthcare provider will discuss possible treatments.      Hearing much better with one ear can be a sign of hearing loss.     Have your hearing checked  Call your healthcare provider if you:   Have to strain to hear normal conversation  Have to watch other people s faces very carefully to follow what they re saying  Need to ask people to repeat what they ve said  Often misunderstand what people are saying  Turn the volume of the television or radio up so high that others complain  Feel that people  are mumbling when they re talking to you  Find that the effort to hear leaves you feeling tired and irritated  Notice, when using the phone, that you hear better with one ear than the other  Radha last reviewed this educational content on 6/1/2022 2000-2022 [employer]. All rights reserved. This information is not intended as a substitute for professional medical care. Always follow your healthcare professional's instructions.

## 2023-04-19 NOTE — PROGRESS NOTES
"SUBJECTIVE:   Evan is a 67 year old who presents for Preventive Visit.      4/19/2023     6:49 AM   Additional Questions   Roomed by mary   Accompanied by alone         4/19/2023     6:49 AM   Patient Reported Additional Medications   Patient reports taking the following new medications zyrtec       Are you in the first 12 months of your Medicare coverage?  No    Healthy Habits:     In general, how would you rate your overall health?  Excellent    Frequency of exercise:  6-7 days/week    Do you usually eat at least 4 servings of fruit and vegetables a day, include whole grains    & fiber and avoid regularly eating high fat or \"junk\" foods?  Yes    Taking medications regularly:  Yes    Medication side effects:  None    Ability to successfully perform activities of daily living:  No assistance needed    Home Safety:  No safety concerns identified    Hearing Impairment:  Difficulty following a conversation in a noisy restaurant or crowded room    In the past 6 months, have you been bothered by leaking of urine?  No    In general, how would you rate your overall mental or emotional health?  Excellent      PHQ-2 Total Score: 0    Additional concerns today:  No    Patient reports increasing his exercise, walking about 1 hour a day 6 days a week.    Patient would like to have several spots on his face accessed. On mid chin that appears as a \"dark freckle\", noticed a couple months ago.  Nose \"freckle\" has been there over a year, and has relatively stayed the same.     Have you ever done Advance Care Planning? (For example, a Health Directive, POLST, or a discussion with a medical provider or your loved ones about your wishes): Yes, patient states has an Advance Care Planning document and will bring a copy to the clinic.       Fall risk  Fallen 2 or more times in the past year?: No  Any fall with injury in the past year?: No    Cognitive Screening   1) Repeat 3 items   2) Clock draw: NORMAL  3) 3 item recall: Recalls 3 " objects  Results: 3 items recalled: COGNITIVE IMPAIRMENT LESS LIKELY    Mini-CogTM Copyright VIKTOR Souza. Licensed by the author for use in [facility]; reprinted with permission (emily@.[facility]). All rights reserved.      Do you have sleep apnea, excessive snoring or daytime drowsiness?: no    Reviewed and updated as needed this visit by clinical staff   Tobacco  Allergies  Meds              Reviewed and updated as needed this visit by Provider     Meds             Social History     Tobacco Use     Smoking status: Never     Smokeless tobacco: Never   Vaping Use     Vaping status: Never Used   Substance Use Topics     Alcohol use: Yes             4/18/2023     6:23 PM   Alcohol Use   Prescreen: >3 drinks/day or >7 drinks/week? No     Do you have a current opioid prescription? No  Do you use any other controlled substances or medications that are not prescribed by a provider? None            Current providers sharing in care for this patient include:   Patient Care Team:  Tenzin Alves MD as PCP - General (Family Practice)  Tenzin Alves MD as Assigned PCP    The following health maintenance items are reviewed in Epic and correct as of today:  Health Maintenance   Topic Date Due     COVID-19 Vaccine (4 - Booster for Pfizer series) 01/10/2022     INFLUENZA VACCINE (1) 09/01/2022     MEDICARE ANNUAL WELLNESS VISIT  03/04/2023     LIPID  04/19/2024     ANNUAL REVIEW OF HM ORDERS  04/19/2024     FALL RISK ASSESSMENT  04/19/2024     DTAP/TDAP/TD IMMUNIZATION (2 - Td or Tdap) 10/09/2025     COLORECTAL CANCER SCREENING  04/14/2027     ADVANCE CARE PLANNING  04/19/2028     HEPATITIS C SCREENING  Completed     PHQ-2 (once per calendar year)  Completed     Pneumococcal Vaccine: 65+ Years  Completed     ZOSTER IMMUNIZATION  Completed     AORTIC ANEURYSM SCREENING (SYSTEM ASSIGNED)  Completed     IPV IMMUNIZATION  Aged Out     MENINGITIS IMMUNIZATION  Aged Out     BP Readings from Last 3 Encounters:    04/19/23 124/84   04/14/22 (!) 126/97   03/04/22 120/68    Wt Readings from Last 3 Encounters:   04/19/23 74.8 kg (165 lb)   04/14/22 75.8 kg (167 lb)   03/04/22 75.8 kg (167 lb)                  Patient Active Problem List   Diagnosis     Hyperlipidemia with target LDL less than 130     Vertigo     Ganglion cyst     AK (actinic keratosis)     Raynaud's phenomenon without gangrene     Foreign body of right ear, initial encounter     Past Surgical History:   Procedure Laterality Date     COLONOSCOPY N/A 4/14/2022    Procedure: COLONOSCOPY, FLEXIBLE, WITH LESION REMOVAL USING SNARE;  Surgeon: Juancarlos Landa DO;  Location: [facility]     HERNIA REPAIR         Social History     Tobacco Use     Smoking status: Never     Smokeless tobacco: Never   Vaping Use     Vaping status: Never Used   Substance Use Topics     Alcohol use: Yes     Family History   Problem Relation Age of Onset     Hypertension Father      Cardiovascular Father 42        heart attack     Prostate Cancer Brother          Current Outpatient Medications   Medication Sig Dispense Refill     aspirin (ASA) 81 MG EC tablet Take 1 tablet (81 mg) by mouth daily 90 tablet 3     atorvastatin (LIPITOR) 40 MG tablet Take 0.5 tablets (20 mg) by mouth daily 90 tablet 3     cetirizine (ZYRTEC) 10 MG tablet Take 10 mg by mouth daily       No Known Allergies          Review of Systems   Constitutional: Negative for chills and fever.   HENT: Negative for congestion, ear pain, hearing loss and sore throat.    Eyes: Negative for pain and visual disturbance.   Respiratory: Negative for cough and shortness of breath.    Cardiovascular: Negative for chest pain, palpitations and peripheral edema.   Gastrointestinal: Negative for abdominal pain, constipation, diarrhea, heartburn, hematochezia and nausea.   Genitourinary: Negative for dysuria, frequency, genital sores, hematuria, impotence, penile discharge and urgency.   Musculoskeletal: Negative for arthralgias, joint  "swelling and myalgias.   Skin: Negative for rash.   Neurological: Negative for dizziness, weakness, headaches and paresthesias.   Psychiatric/Behavioral: Negative for mood changes. The patient is not nervous/anxious.            OBJECTIVE:   /84   Pulse 70   Temp 97.4  F (36.3  C) (Temporal)   Resp 16   Ht 1.764 m (5' 9.45\")   Wt 74.8 kg (165 lb)   SpO2 99%   BMI 24.05 kg/m   Estimated body mass index is 24.05 kg/m  as calculated from the following:    Height as of this encounter: 1.764 m (5' 9.45\").    Weight as of this encounter: 74.8 kg (165 lb).  Physical Exam  GENERAL: healthy, alert and no distress  NECK: no adenopathy, no asymmetry, masses, or scars and thyroid normal to palpation  RESP: lungs clear to auscultation - no rales, rhonchi or wheezes  CV: regular rate and rhythm, normal S1 S2, no S3 or S4, no murmur, click or rub, no peripheral edema and peripheral pulses strong  ABDOMEN: soft, nontender, no hepatosplenomegaly, no masses and bowel sounds normal  MS: no gross musculoskeletal defects noted, no edema  SKIN: solar lentigines on nose, chin.  Lesion c/w AK on left forehead, but previously frozen 2 times.  Could be low-grade skin CA.    Diagnostic Test Results:  Labs reviewed in Epic  No results found for this or any previous visit (from the past 24 hour(s)).  Results for orders placed or performed in visit on 04/19/23   PSA, screen     Status: Normal   Result Value Ref Range    Prostate Specific Antigen Screen 1.93 0.00 - 4.50 ng/mL    Narrative    This result is obtained using the Roche Elecsys total PSA method on the sherrell [id number] immunoassay analyzer. Results obtained with different assay methods or kits cannot be used interchangeably.   Comprehensive metabolic panel (BMP + Alb, Alk Phos, ALT, AST, Total. Bili, TP)     Status: Abnormal   Result Value Ref Range    Sodium 140 136 - 145 mmol/L    Potassium 3.8 3.4 - 5.3 mmol/L    Chloride 102 98 - 107 mmol/L    Carbon Dioxide (CO2) 30 (H) 22 - " 29 mmol/L    Anion Gap 8 7 - 15 mmol/L    Urea Nitrogen 15.2 8.0 - 23.0 mg/dL    Creatinine 1.15 0.67 - 1.17 mg/dL    Calcium 9.1 8.8 - 10.2 mg/dL    Glucose 105 (H) 70 - 99 mg/dL    Alkaline Phosphatase 81 40 - 129 U/L    AST 18 10 - 50 U/L    ALT 22 10 - 50 U/L    Protein Total 6.9 6.4 - 8.3 g/dL    Albumin 4.1 3.5 - 5.2 g/dL    Bilirubin Total 0.4 <=1.2 mg/dL    GFR Estimate 70 >60 mL/min/1.73m2   Lipid panel reflex to direct LDL Non-fasting     Status: Normal   Result Value Ref Range    Cholesterol 131 <200 mg/dL    Triglycerides 83 <150 mg/dL    Direct Measure HDL 46 >=40 mg/dL    LDL Cholesterol Calculated 68 <=100 mg/dL    Non HDL Cholesterol 85 <130 mg/dL    Narrative    Cholesterol  Desirable:  <200 mg/dL    Triglycerides  Normal:  Less than 150 mg/dL  Borderline High:  150-199 mg/dL  High:  200-499 mg/dL  Very High:  Greater than or equal to 500 mg/dL    Direct Measure HDL  Female:  Greater than or equal to 50 mg/dL   Male:  Greater than or equal to 40 mg/dL    LDL Cholesterol  Desirable:  <100mg/dL  Above Desirable:  100-129 mg/dL   Borderline High:  130-159 mg/dL   High:  160-189 mg/dL   Very High:  >= 190 mg/dL    Non HDL Cholesterol  Desirable:  130 mg/dL  Above Desirable:  130-159 mg/dL  Borderline High:  160-189 mg/dL  High:  190-219 mg/dL  Very High:  Greater than or equal to 220 mg/dL       ASSESSMENT / PLAN:       ICD-10-CM    1. Encounter for Medicare annual wellness exam  Z00.00       2. Hyperlipidemia with target LDL less than 130  E78.5 Lipid panel reflex to direct LDL Non-fasting     atorvastatin (LIPITOR) 40 MG tablet     aspirin (ASA) 81 MG EC tablet     Comprehensive metabolic panel (BMP + Alb, Alk Phos, ALT, AST, Total. Bili, TP)     Comprehensive metabolic panel (BMP + Alb, Alk Phos, ALT, AST, Total. Bili, TP)     Lipid panel reflex to direct LDL Non-fasting      3. AK (actinic keratosis)  L57.0       4. Solar lentigo  L81.4       5. Special screening for malignant neoplasm of prostate   Z12.5 PSA, screen     PSA, screen      6. Need for vaccination  Z23 Pneumococcal 20 Valent Conjugate (PCV20)        1.Reviewed recommended screenings and ordered appropriate testing for pt's risks and per pt's request(s). Discussed benefits/risks of continuing ASA, including decreasing risk of CVA or MI, with increased risk of bleeding.  Patient elects to continue daily aspirin given family history of MI.   2. .Clinically controlled.  Will continue current regimen and check monitoring labs.  3. Discussed options for treatment including cryo or removal/biopsy. Patient elects removal and biopsy has tried cryo in the past and lesion has returned. Recommend scheduling procedure when convenient.   4. Relatively unchanged, will continue to monitor.   5.  Family history of prostate cancer, PSA screening ordered  6. Immunized       Physician Attestation   I, Tenzin Alves MD, MD, was present with the medical/EMORY student who participated in the service and in the documentation of the note.  I have verified the history and personally performed the physical exam and medical decision making.  I agree with the assessment and plan of care as documented in the note.      Items personally reviewed: vitals, labs and exam and agree with the interpretation documented in the note.    Tenzin Alves MD, MD      COUNSELING:  Reviewed preventive health counseling, as reflected in patient instructions       Regular exercise       Healthy diet/nutrition       Aspirin prophylaxis        Prostate cancer screening       The 10-year ASCVD risk score (Ronaldo BRITO, et al., 2019) is: 10.3%    Values used to calculate the score:      Age: 67 years      Sex: Male      Is Non- : No      Diabetic: No      Tobacco smoker: No      Systolic Blood Pressure: 124 mmHg      Is BP treated: No      HDL Cholesterol: 46 mg/dL      Total Cholesterol: 131 mg/dL        He reports that he has never smoked. He has never used  smokeless tobacco.      Appropriate preventive services were discussed with this patient, including applicable screening as appropriate for cardiovascular disease, diabetes, osteopenia/osteoporosis, and glaucoma.  As appropriate for age/gender, discussed screening for colorectal cancer, prostate cancer, breast cancer, and cervical cancer. Checklist reviewing preventive services available has been given to the patient.    Reviewed patients plan of care and provided an AVS. The Intermediate Care Plan ( asthma action plan, low back pain action plan, and migraine action plan) for Evan meets the Care Plan requirement. This Care Plan has been established and reviewed with the Patient.          Tenzin Alves MD, MD  [facility]    Identified Health Risks:    I have reviewed Opioid Use Disorder and Substance Use Disorder risk factors and made any needed referrals.       The patient was provided with written information regarding signs of hearing loss.

## 2023-04-19 NOTE — RESULT ENCOUNTER NOTE
Evan,    All of your labs were normal for you except slight elevation of your blood sugar.  Slightly better than yesterday.    Encourage you to control carbohydrates in your diet and exercise regularly to keep this under control.    Have a nice day!    Dr. Alves

## 2023-04-22 PROBLEM — Z80.42 FAMILY HISTORY OF PROSTATE CANCER: Status: ACTIVE | Noted: 2023-04-22

## 2024-04-22 SDOH — HEALTH STABILITY: PHYSICAL HEALTH: ON AVERAGE, HOW MANY DAYS PER WEEK DO YOU ENGAGE IN MODERATE TO STRENUOUS EXERCISE (LIKE A BRISK WALK)?: 5 DAYS

## 2024-04-22 SDOH — HEALTH STABILITY: PHYSICAL HEALTH: ON AVERAGE, HOW MANY MINUTES DO YOU ENGAGE IN EXERCISE AT THIS LEVEL?: 60 MIN

## 2024-04-22 ASSESSMENT — SOCIAL DETERMINANTS OF HEALTH (SDOH): HOW OFTEN DO YOU GET TOGETHER WITH FRIENDS OR RELATIVES?: ONCE A WEEK

## 2024-04-26 ENCOUNTER — OFFICE VISIT (OUTPATIENT)
Dept: FAMILY MEDICINE | Facility: OTHER | Age: 69
End: 2024-04-26
Attending: FAMILY MEDICINE
Payer: COMMERCIAL

## 2024-04-26 VITALS
OXYGEN SATURATION: 98 % | WEIGHT: 162 LBS | HEIGHT: 70 IN | BODY MASS INDEX: 23.19 KG/M2 | RESPIRATION RATE: 20 BRPM | TEMPERATURE: 98 F | DIASTOLIC BLOOD PRESSURE: 78 MMHG | HEART RATE: 64 BPM | SYSTOLIC BLOOD PRESSURE: 104 MMHG

## 2024-04-26 DIAGNOSIS — Z12.5 SCREENING FOR PROSTATE CANCER: ICD-10-CM

## 2024-04-26 DIAGNOSIS — L82.0 INFLAMED SEBORRHEIC KERATOSIS: ICD-10-CM

## 2024-04-26 DIAGNOSIS — R73.01 IMPAIRED FASTING GLUCOSE: ICD-10-CM

## 2024-04-26 DIAGNOSIS — Z80.42 FAMILY HISTORY OF PROSTATE CANCER: ICD-10-CM

## 2024-04-26 DIAGNOSIS — Z51.81 ENCOUNTER FOR THERAPEUTIC DRUG MONITORING: ICD-10-CM

## 2024-04-26 DIAGNOSIS — Z29.11 NEED FOR VACCINATION AGAINST RESPIRATORY SYNCYTIAL VIRUS: ICD-10-CM

## 2024-04-26 DIAGNOSIS — R39.14 BENIGN PROSTATIC HYPERPLASIA WITH INCOMPLETE BLADDER EMPTYING: ICD-10-CM

## 2024-04-26 DIAGNOSIS — N40.1 BENIGN PROSTATIC HYPERPLASIA WITH INCOMPLETE BLADDER EMPTYING: ICD-10-CM

## 2024-04-26 DIAGNOSIS — Z00.00 ENCOUNTER FOR MEDICARE ANNUAL WELLNESS EXAM: Primary | ICD-10-CM

## 2024-04-26 DIAGNOSIS — E78.5 HYPERLIPIDEMIA WITH TARGET LDL LESS THAN 130: ICD-10-CM

## 2024-04-26 LAB
ALBUMIN SERPL BCG-MCNC: 4.3 G/DL (ref 3.5–5.2)
ALP SERPL-CCNC: 66 U/L (ref 40–150)
ALT SERPL W P-5'-P-CCNC: 24 U/L (ref 0–70)
ANION GAP SERPL CALCULATED.3IONS-SCNC: 9 MMOL/L (ref 7–15)
AST SERPL W P-5'-P-CCNC: 17 U/L (ref 0–45)
BILIRUB SERPL-MCNC: 0.6 MG/DL
BUN SERPL-MCNC: 13.6 MG/DL (ref 8–23)
CALCIUM SERPL-MCNC: 9.3 MG/DL (ref 8.8–10.2)
CHLORIDE SERPL-SCNC: 105 MMOL/L (ref 98–107)
CHOLEST SERPL-MCNC: 124 MG/DL
CREAT SERPL-MCNC: 1.17 MG/DL (ref 0.67–1.17)
DEPRECATED HCO3 PLAS-SCNC: 27 MMOL/L (ref 22–29)
EGFRCR SERPLBLD CKD-EPI 2021: 68 ML/MIN/1.73M2
ERYTHROCYTE [DISTWIDTH] IN BLOOD BY AUTOMATED COUNT: 13.2 % (ref 10–15)
FASTING STATUS PATIENT QL REPORTED: YES
GLUCOSE SERPL-MCNC: 103 MG/DL (ref 70–99)
HCT VFR BLD AUTO: 46.3 % (ref 40–53)
HDLC SERPL-MCNC: 46 MG/DL
HGB BLD-MCNC: 15.3 G/DL (ref 13.3–17.7)
LDLC SERPL CALC-MCNC: 63 MG/DL
MCH RBC QN AUTO: 32.1 PG (ref 26.5–33)
MCHC RBC AUTO-ENTMCNC: 33 G/DL (ref 31.5–36.5)
MCV RBC AUTO: 97 FL (ref 78–100)
NONHDLC SERPL-MCNC: 78 MG/DL
PLATELET # BLD AUTO: 196 10E3/UL (ref 150–450)
POTASSIUM SERPL-SCNC: 4.2 MMOL/L (ref 3.4–5.3)
PROT SERPL-MCNC: 7.5 G/DL (ref 6.4–8.3)
PSA SERPL DL<=0.01 NG/ML-MCNC: 2.16 NG/ML (ref 0–4.5)
RBC # BLD AUTO: 4.76 10E6/UL (ref 4.4–5.9)
SODIUM SERPL-SCNC: 141 MMOL/L (ref 135–145)
TRIGL SERPL-MCNC: 74 MG/DL
WBC # BLD AUTO: 5.8 10E3/UL (ref 4–11)

## 2024-04-26 PROCEDURE — 83036 HEMOGLOBIN GLYCOSYLATED A1C: CPT | Performed by: FAMILY MEDICINE

## 2024-04-26 PROCEDURE — 80053 COMPREHEN METABOLIC PANEL: CPT | Performed by: FAMILY MEDICINE

## 2024-04-26 PROCEDURE — G0103 PSA SCREENING: HCPCS | Performed by: FAMILY MEDICINE

## 2024-04-26 PROCEDURE — 17110 DESTRUCTION B9 LES UP TO 14: CPT | Performed by: FAMILY MEDICINE

## 2024-04-26 PROCEDURE — 99214 OFFICE O/P EST MOD 30 MIN: CPT | Mod: 25 | Performed by: FAMILY MEDICINE

## 2024-04-26 PROCEDURE — 36415 COLL VENOUS BLD VENIPUNCTURE: CPT | Performed by: FAMILY MEDICINE

## 2024-04-26 PROCEDURE — 80061 LIPID PANEL: CPT | Performed by: FAMILY MEDICINE

## 2024-04-26 PROCEDURE — 85027 COMPLETE CBC AUTOMATED: CPT | Performed by: FAMILY MEDICINE

## 2024-04-26 PROCEDURE — G0439 PPPS, SUBSEQ VISIT: HCPCS | Performed by: FAMILY MEDICINE

## 2024-04-26 RX ORDER — RESPIRATORY SYNCYTIAL VIRUS VACCINE 120MCG/0.5
0.5 KIT INTRAMUSCULAR ONCE
Qty: 1 EACH | Refills: 0 | Status: CANCELLED | OUTPATIENT
Start: 2024-04-26 | End: 2024-04-26

## 2024-04-26 RX ORDER — TAMSULOSIN HYDROCHLORIDE 0.4 MG/1
0.4 CAPSULE ORAL EVERY EVENING
Qty: 90 CAPSULE | Refills: 3 | Status: SHIPPED | OUTPATIENT
Start: 2024-04-26

## 2024-04-26 RX ORDER — ATORVASTATIN CALCIUM 40 MG/1
20 TABLET, FILM COATED ORAL DAILY
Qty: 90 TABLET | Refills: 3 | Status: SHIPPED | OUTPATIENT
Start: 2024-04-26

## 2024-04-26 ASSESSMENT — PAIN SCALES - GENERAL: PAINLEVEL: NO PAIN (0)

## 2024-04-26 NOTE — PATIENT INSTRUCTIONS
The spots we froze may blister up, but then should fall off in 1-2 weeks.      If any of these recur, we should biopsy them.     Try Flomax to help with your urinary symptoms.  If not improving within a week or two, then stop and let me know.    We will let you know your results as soon as we can.  If you have MyChart, you will be able to see your lab and imaging results shortly after they become available.  I will review these and let you know my interpretation, but this usually takes additional time.  If you have multiple labs, I usually wait until they are all back before sending a note about them unless something is worrisome.  If you do not have MyChart, we will let you know your lab results as soon as we can.  If they are normal, this can take up to a week.     I'd encourage you to update your immunizations this fall.    Contact us or return if questions or concerns.  Have a nice day!    Dr. Alves      Preventive Care Advice   This is general advice given by our system to help you stay healthy. However, your care team may have specific advice just for you. Please talk to your care team about your preventive care needs.  Nutrition  Eat 5 or more servings of fruits and vegetables each day.  Try wheat bread, brown rice and whole grain pasta (instead of white bread, rice, and pasta).  Get enough calcium and vitamin D. Check the label on foods and aim for 100% of the RDA (recommended daily allowance).  Lifestyle  Exercise at least 150 minutes each week   (30 minutes a day, 5 days a week).  Do muscle strengthening activities 2 days a week. These help control your weight and prevent disease.  No smoking.  Wear sunscreen to prevent skin cancer.  Have a dental exam and cleaning every 6 months.  Yearly exams  See your health care team every year to talk about:  Any changes in your health.  Any medicines your care team has prescribed.  Preventive care, family planning, and ways to prevent chronic diseases.  Shots  (vaccines)   HPV shots (up to age 26), if you've never had them before.  Hepatitis B shots (up to age 59), if you've never had them before.  COVID-19 shot: Get this shot when it's due.  Flu shot: Get a flu shot every year.  Tetanus shot: Get a tetanus shot every 10 years.  Pneumococcal, hepatitis A, and RSV shots: Ask your care team if you need these based on your risk.  Shingles shot (for age 50 and up).  General health tests  Diabetes screening:  Starting at age 35, Get screened for diabetes at least every 3 years.  If you are younger than age 35, ask your care team if you should be screened for diabetes.  Cholesterol test: At age 39, start having a cholesterol test every 5 years, or more often if advised.  Bone density scan (DEXA): At age 50, ask your care team if you should have this scan for osteoporosis (brittle bones).  Hepatitis C: Get tested at least once in your life.  STIs (sexually transmitted infections)  Before age 24: Ask your care team if you should be screened for STIs.  After age 24: Get screened for STIs if you're at risk. You are at risk for STIs (including HIV) if:  You are sexually active with more than one person.  You don't use condoms every time.  You or a partner was diagnosed with a sexually transmitted infection.  If you are at risk for HIV, ask about PrEP medicine to prevent HIV.  Get tested for HIV at least once in your life, whether you are at risk for HIV or not.  Cancer screening tests  Cervical cancer screening: If you have a cervix, begin getting regular cervical cancer screening tests at age 21. Most people who have regular screenings with normal results can stop after age 65. Talk about this with your provider.  Breast cancer scan (mammogram): If you've ever had breasts, begin having regular mammograms starting at age 40. This is a scan to check for breast cancer.  Colon cancer screening: It is important to start screening for colon cancer at age 45.  Have a colonoscopy test  every 10 years (or more often if you're at risk) Or, ask your provider about stool tests like a FIT test every year or Cologuard test every 3 years.  To learn more about your testing options, visit: [email or web address].  For help making a decision, visit: https://bit.manuelito/eq00750.  Prostate cancer screening test: If you have a prostate and are age 55 to 69, ask your provider if you would benefit from a yearly prostate cancer screening test.  Lung cancer screening: If you are a current or former smoker age 50 to 80, ask your care team if ongoing lung cancer screenings are right for you.  For informational purposes only. Not to replace the advice of your health care provider. Copyright   2023 [employer]. All rights reserved. Clinically reviewed by the  [employer] [facility]. [employer] [id number] - REV 01/24.    Hearing Loss: Care Instructions  Overview     Hearing loss is a sudden or slow decrease in how well you hear. It can range from slight to profound. Permanent hearing loss can occur with aging. It also can happen when you are exposed long-term to loud noise. Examples include listening to loud music, riding motorcycles, or being around other loud machines.  Hearing loss can affect your work and home life. It can make you feel lonely or depressed. You may feel that you have lost your independence. But hearing aids and other devices can help you hear better and feel connected to others.  Follow-up care is a key part of your treatment and safety. Be sure to make and go to all appointments, and call your doctor if you are having problems. It's also a good idea to know your test results and keep a list of the medicines you take.  How can you care for yourself at home?  Avoid loud noises whenever possible. This helps keep your hearing from getting worse.  Always wear hearing protection around loud noises.  Wear a hearing aid as directed.  A professional can help you pick a hearing  "aid that will work best for you.  You can also get hearing aids over the counter for mild to moderate hearing loss.  Have hearing tests as your doctor suggests. They can show whether your hearing has changed. Your hearing aid may need to be adjusted.  Use other devices as needed. These may include:  Telephone amplifiers and hearing aids that can connect to a television, stereo, radio, or microphone.  Devices that use lights or vibrations. These alert you to the doorbell, a ringing telephone, or a baby monitor.  Television closed-captioning. This shows the words at the bottom of the screen. Most new TVs can do this.  TTY (text telephone). This lets you type messages back and forth on the telephone instead of talking or listening. These devices are also called TDD. When messages are typed on the keyboard, they are sent over the phone line to a receiving TTY. The message is shown on a monitor.  Use text messaging, social media, and email if it is hard for you to communicate by telephone.  Try to learn a listening technique called speechreading. It is not lipreading. You pay attention to people's gestures, expressions, posture, and tone of voice. These clues can help you understand what a person is saying. Face the person you are talking to, and have them face you. Make sure the lighting is good. You need to see the other person's face clearly.  Think about counseling if you need help to adjust to your hearing loss.  When should you call for help?  Watch closely for changes in your health, and be sure to contact your doctor if:    You think your hearing is getting worse.     You have new symptoms, such as dizziness or nausea.   Where can you learn more?  Go to https://www.[employer].net/patiented  Enter R798 in the search box to learn more about \"Hearing Loss: Care Instructions.\"  Current as of: September 27, 2023               Content Version: 14.0    2227-0978 [employer].   Care instructions adapted " under license by your healthcare professional. If you have questions about a medical condition or this instruction, always ask your healthcare professional. [employer] disclaims any warranty or liability for your use of this information.

## 2024-04-26 NOTE — PROGRESS NOTES
Preventive Care Visit  [facility]  Tenzin Alves MD, MD, Family Medicine  Apr 26, 2024      Assessment & Plan       ICD-10-CM    1. Encounter for Medicare annual wellness exam  Z00.00       2. Benign prostatic hyperplasia with incomplete bladder emptying  N40.1 tamsulosin (FLOMAX) 0.4 MG capsule    R39.14       3. Hyperlipidemia with target LDL less than 130  E78.5 Lipid panel reflex to direct LDL Non-fasting     atorvastatin (LIPITOR) 40 MG tablet     Comprehensive metabolic panel (BMP + Alb, Alk Phos, ALT, AST, Total. Bili, TP)     Lipid panel reflex to direct LDL Non-fasting     Comprehensive metabolic panel (BMP + Alb, Alk Phos, ALT, AST, Total. Bili, TP)      4. Impaired fasting glucose  R73.01 Comprehensive metabolic panel (BMP + Alb, Alk Phos, ALT, AST, Total. Bili, TP)     Comprehensive metabolic panel (BMP + Alb, Alk Phos, ALT, AST, Total. Bili, TP)     Hemoglobin A1c      5. Inflamed seborrheic keratosis  L82.0 DESTRUCT BENIGN LESION, UP TO 14      6. Family history of prostate cancer  Z80.42 PSA, screen     PSA, screen      7. Screening for prostate cancer  Z12.5 PSA, screen     PSA, screen      8. Encounter for therapeutic drug monitoring  Z51.81 CBC with platelets     CBC with platelets      9. Need for vaccination against respiratory syncytial virus  Z29.11            Reviewed recommended screenings and ordered appropriate testing for pt's risks and per pt's request(s).   Increasingly symptomatic.  Recommended trial of Flomax to see if this is helpful.  If not, would have low threshold for referral to urology.  Patient has seen urology in the past and was known to have BPH at that time.  If not having adequate response to Flomax, would also recommend urologic consultation.  Well-controlled.  Will continue current regimen.  Check monitoring labs.  Patient has made excellent efforts at lifestyle modifications.  Discussed with him that if his sugars are still elevated, this  is likely related to genetic etiology.  Will check labs and consider if further interventions are warranted.  Concerning SK was frozen with 2 cycles of liquid nitrogen after informed consent.  If this recurs, would have low threshold for biopsy.  Patient tolerated procedure well.  6, 7.  Will check PSA.  8.  Labs obtained.  9.  Patient declined immunization.  Can consider getting this in the future at the pharmacy.      Portions of this note were completed using Dragon dictation software.  Although reviewed, there may be typographical and other inadvertent errors that remain.           Ordering of each unique test  Prescription drug management        Counseling  Appropriate preventive services were discussed with this patient, including applicable screening as appropriate for fall prevention, nutrition, physical activity, Tobacco-use cessation, weight loss and cognition.  Checklist reviewing preventive services available has been given to the patient.  Reviewed patient's diet, addressing concerns and/or questions.   The patient was provided with written information regarding signs of hearing loss.       Patient Instructions   The spots we froze may blister up, but then should fall off in 1-2 weeks.      If any of these recur, we should biopsy them.     Try Flomax to help with your urinary symptoms.  If not improving within a week or two, then stop and let me know.    We will let you know your results as soon as we can.  If you have MyChart, you will be able to see your lab and imaging results shortly after they become available.  I will review these and let you know my interpretation, but this usually takes additional time.  If you have multiple labs, I usually wait until they are all back before sending a note about them unless something is worrisome.  If you do not have MyChart, we will let you know your lab results as soon as we can.  If they are normal, this can take up to a week.     I'd encourage you to update  your immunizations this fall.    Contact us or return if questions or concerns.  Have a nice day!    Dr. Alves      Preventive Care Advice   This is general advice given by our system to help you stay healthy. However, your care team may have specific advice just for you. Please talk to your care team about your preventive care needs.  Nutrition  Eat 5 or more servings of fruits and vegetables each day.  Try wheat bread, brown rice and whole grain pasta (instead of white bread, rice, and pasta).  Get enough calcium and vitamin D. Check the label on foods and aim for 100% of the RDA (recommended daily allowance).  Lifestyle  Exercise at least 150 minutes each week   (30 minutes a day, 5 days a week).  Do muscle strengthening activities 2 days a week. These help control your weight and prevent disease.  No smoking.  Wear sunscreen to prevent skin cancer.  Have a dental exam and cleaning every 6 months.  Yearly exams  See your health care team every year to talk about:  Any changes in your health.  Any medicines your care team has prescribed.  Preventive care, family planning, and ways to prevent chronic diseases.  Shots (vaccines)   HPV shots (up to age 26), if you've never had them before.  Hepatitis B shots (up to age 59), if you've never had them before.  COVID-19 shot: Get this shot when it's due.  Flu shot: Get a flu shot every year.  Tetanus shot: Get a tetanus shot every 10 years.  Pneumococcal, hepatitis A, and RSV shots: Ask your care team if you need these based on your risk.  Shingles shot (for age 50 and up).  General health tests  Diabetes screening:  Starting at age 35, Get screened for diabetes at least every 3 years.  If you are younger than age 35, ask your care team if you should be screened for diabetes.  Cholesterol test: At age 39, start having a cholesterol test every 5 years, or more often if advised.  Bone density scan (DEXA): At age 50, ask your care team if you should have this scan for  osteoporosis (brittle bones).  Hepatitis C: Get tested at least once in your life.  STIs (sexually transmitted infections)  Before age 24: Ask your care team if you should be screened for STIs.  After age 24: Get screened for STIs if you're at risk. You are at risk for STIs (including HIV) if:  You are sexually active with more than one person.  You don't use condoms every time.  You or a partner was diagnosed with a sexually transmitted infection.  If you are at risk for HIV, ask about PrEP medicine to prevent HIV.  Get tested for HIV at least once in your life, whether you are at risk for HIV or not.  Cancer screening tests  Cervical cancer screening: If you have a cervix, begin getting regular cervical cancer screening tests at age 21. Most people who have regular screenings with normal results can stop after age 65. Talk about this with your provider.  Breast cancer scan (mammogram): If you've ever had breasts, begin having regular mammograms starting at age 40. This is a scan to check for breast cancer.  Colon cancer screening: It is important to start screening for colon cancer at age 45.  Have a colonoscopy test every 10 years (or more often if you're at risk) Or, ask your provider about stool tests like a FIT test every year or Cologuard test every 3 years.  To learn more about your testing options, visit: https://www.[employer]/235861.pdf.  For help making a decision, visit: https://bit.ly/xi49199.  Prostate cancer screening test: If you have a prostate and are age 55 to 69, ask your provider if you would benefit from a yearly prostate cancer screening test.  Lung cancer screening: If you are a current or former smoker age 50 to 80, ask your care team if ongoing lung cancer screenings are right for you.  For informational purposes only. Not to replace the advice of your health care provider. Copyright   2023 [employer] All rights reserved. Clinically reviewed by the [facility]  Transitions Program. Smish 680763 - REV 01/24.    Hearing Loss: Care Instructions  Overview     Hearing loss is a sudden or slow decrease in how well you hear. It can range from slight to profound. Permanent hearing loss can occur with aging. It also can happen when you are exposed long-term to loud noise. Examples include listening to loud music, riding motorcycles, or being around other loud machines.  Hearing loss can affect your work and home life. It can make you feel lonely or depressed. You may feel that you have lost your independence. But hearing aids and other devices can help you hear better and feel connected to others.  Follow-up care is a key part of your treatment and safety. Be sure to make and go to all appointments, and call your doctor if you are having problems. It's also a good idea to know your test results and keep a list of the medicines you take.  How can you care for yourself at home?  Avoid loud noises whenever possible. This helps keep your hearing from getting worse.  Always wear hearing protection around loud noises.  Wear a hearing aid as directed.  A professional can help you pick a hearing aid that will work best for you.  You can also get hearing aids over the counter for mild to moderate hearing loss.  Have hearing tests as your doctor suggests. They can show whether your hearing has changed. Your hearing aid may need to be adjusted.  Use other devices as needed. These may include:  Telephone amplifiers and hearing aids that can connect to a television, stereo, radio, or microphone.  Devices that use lights or vibrations. These alert you to the doorbell, a ringing telephone, or a baby monitor.  Television closed-captioning. This shows the words at the bottom of the screen. Most new TVs can do this.  TTY (text telephone). This lets you type messages back and forth on the telephone instead of talking or listening. These devices are also called TDD. When messages are typed on the  "keyboard, they are sent over the phone line to a receiving TTY. The message is shown on a monitor.  Use text messaging, social media, and email if it is hard for you to communicate by telephone.  Try to learn a listening technique called speechreading. It is not lipreading. You pay attention to people's gestures, expressions, posture, and tone of voice. These clues can help you understand what a person is saying. Face the person you are talking to, and have them face you. Make sure the lighting is good. You need to see the other person's face clearly.  Think about counseling if you need help to adjust to your hearing loss.  When should you call for help?  Watch closely for changes in your health, and be sure to contact your doctor if:    You think your hearing is getting worse.     You have new symptoms, such as dizziness or nausea.   Where can you learn more?  Go to https://www.[employer].net/patiented  Enter R798 in the search box to learn more about \"Hearing Loss: Care Instructions.\"  Current as of: September 27, 2023               Content Version: 14.0    9313-3606 [employer].   Care instructions adapted under license by your healthcare professional. If you have questions about a medical condition or this instruction, always ask your healthcare professional. [employer] disclaims any warranty or liability for your use of this information.      Santiago Gordon is a 68 year old, presenting for the following:  Physical        4/26/2024     7:27 AM   Additional Questions   Roomed by Tiffanie   Accompanied by Self         4/26/2024     7:27 AM   Patient Reported Additional Medications   Patient reports taking the following new medications NA         Health Care Directive  Patient does not have a Health Care Directive or Living Will: Patient states has Advance Directive and will bring in a copy to clinic.    HPI    Would like to have his skin checked today.    He has decreased all of his " high glycemic foods.  Hoping to see further improvement in his glucose tolerance.    Having some urinary issues.  Has a hard time starting and fully emptying his bladder.  Not much change in frequency.  Gets up 3-5 times/night to urinate.      Having some problems with vertigo after drinking bourbon.  Has changed to beer, so does get some carbs there.  That's the only simple carbs that he drinks.  He thinks the vertigo is related to the tannins.      Hyperlipidemia Follow-Up    Are you regularly taking any medication or supplement to lower your cholesterol?   Yes- Atorvastatin  Are you having muscle aches or other side effects that you think could be caused by your cholesterol lowering medication?  No        4/22/2024   General Health   How would you rate your overall physical health? Excellent   Feel stress (tense, anxious, or unable to sleep) Not at all         4/22/2024   Nutrition   Diet: Regular (no restrictions)         4/22/2024   Exercise   Days per week of moderate/strenous exercise 5 days   Average minutes spent exercising at this level 60 min         4/22/2024   Social Factors   Frequency of gathering with friends or relatives Once a week   Worry food won't last until get money to buy more No   Food not last or not have enough money for food? No   Do you have housing?  Yes   Are you worried about losing your housing? No   Lack of transportation? No   Unable to get utilities (heat,electricity)? No         4/26/2024   Fall Risk   Fallen 2 or more times in the past year? No   Trouble with walking or balance? No          4/22/2024   Activities of Daily Living- Home Safety   Needs help with the following daily activites None of the above   Safety concerns in the home None of the above         4/22/2024   Dental   Dentist two times every year? Yes         4/22/2024   Hearing Screening   Hearing concerns? (!) IT'S HARD TO FOLLOW A CONVERSATION IN A NOISY RESTAURANT OR CROWDED ROOM.         4/22/2024   Driving  Risk Screening   Patient/family members have concerns about driving No         4/22/2024   General Alertness/Fatigue Screening   Have you been more tired than usual lately? No         4/22/2024   Urinary Incontinence Screening   Bothered by leaking urine in past 6 months No         4/22/2024   TB Screening   Were you born outside of the [place]? No         Today's PHQ-2 Score:       4/26/2024     7:21 AM   PHQ-2 ( 1999 Pfizer)   Q1: Little interest or pleasure in doing things 0   Q2: Feeling down, depressed or hopeless 0   PHQ-2 Score 0   Q1: Little interest or pleasure in doing things Not at all   Q2: Feeling down, depressed or hopeless Not at all   PHQ-2 Score 0           4/22/2024   Substance Use   Alcohol more than 3/day or more than 7/wk No   Do you have a current opioid prescription? No   How severe/bad is pain from 1 to 10? 0/10 (No Pain)   Do you use any other substances recreationally? No     Social History     Tobacco Use    Smoking status: Never    Smokeless tobacco: Never   Vaping Use    Vaping status: Never Used   Substance Use Topics    Alcohol use: Yes    Drug use: No   Drinks one beer/day.        4/22/2024   AAA Screening   Family history of Abdominal Aortic Aneurysm (AAA)? No   Last PSA:   PSA   Date Value Ref Range Status   02/07/2020 1.97 0 - 4 ug/L Final     Comment:     Assay Method:  Chemiluminescence using Siemens Vista analyzer     Prostate Specific Antigen Screen   Date Value Ref Range Status   04/19/2023 1.93 0.00 - 4.50 ng/mL Final   03/04/2022 2.34 0.00 - 4.00 ug/L Final     ASCVD Risk   The 10-year ASCVD risk score (Ronaldo BRITO, et al., 2019) is: 8.4%    Values used to calculate the score:      Age: 68 years      Sex: Male      Is Non- : No      Diabetic: No      Tobacco smoker: No      Systolic Blood Pressure: 104 mmHg      Is BP treated: No      HDL Cholesterol: 46 mg/dL      Total Cholesterol: 131 mg/dL    Fracture Risk Assessment Tool  Link to Frax  Calculator  Use the information below to complete the Frax calculator  : 1955  Sex: male  Weight (kg): 73.5 kg (actual weight)  Height (cm): 177 cm  Previous Fragility Fracture:  No  History of parent with fractured hip:  No  Current Smoking:  No  Patient has been on glucocorticoids for more than 3 months (5mg/day or more): No  Rheumatoid Arthritis on Problem List:  No  Secondary Osteoporosis on Problem List:  No  Consumes 3 or more units of alcohol per day: No  Femoral Neck BMD (g/cm2)            Reviewed and updated as needed this visit by Provider                    BP Readings from Last 3 Encounters:   24 104/78   23 124/84   22 (!) 126/97    Wt Readings from Last 3 Encounters:   24 73.5 kg (162 lb)   23 74.8 kg (165 lb)   22 75.8 kg (167 lb)                  Patient Active Problem List   Diagnosis    Hyperlipidemia with target LDL less than 130    Vertigo    Ganglion cyst    AK (actinic keratosis)    Raynaud's phenomenon without gangrene    Foreign body of right ear, initial encounter    Family history of prostate cancer     Past Surgical History:   Procedure Laterality Date    COLONOSCOPY N/A 2022    Procedure: COLONOSCOPY, FLEXIBLE, WITH LESION REMOVAL USING SNARE;  Surgeon: Juancarlos Landa DO;  Location: [facility]    HERNIA REPAIR         Social History     Tobacco Use    Smoking status: Never    Smokeless tobacco: Never   Substance Use Topics    Alcohol use: Yes     Family History   Problem Relation Age of Onset    Hypertension Father     Cardiovascular Father 42        heart attack    Prostate Cancer Brother          Current Outpatient Medications   Medication Sig Dispense Refill    aspirin (ASA) 81 MG EC tablet Take 1 tablet (81 mg) by mouth daily 90 tablet 3    atorvastatin (LIPITOR) 40 MG tablet Take 0.5 tablets (20 mg) by mouth daily 90 tablet 3    cetirizine (ZYRTEC) 10 MG tablet Take 10 mg by mouth daily       No Known Allergies  Recent Labs   Lab  "Test 04/19/23  0727 03/04/22  0816 02/08/21  0749 02/07/20  0946 12/22/17  1026 11/09/16  1421   LDL 68 64 60 70   < > 84   HDL 46 56 57 62   < > 58   TRIG 83 85 67 89   < > 79   ALT 22 30 28 61   < > 25   CR 1.15 1.18 1.06 1.16   < > 1.25   GFRESTIMATED 70 68 73 66   < > 59*   GFRESTBLACK  --   --  85 77   < > 71   POTASSIUM 3.8 4.0 3.7 4.2   < > 4.3   TSH  --   --   --   --   --  1.49    < > = values in this interval not displayed.      Current providers sharing in care for this patient include:  Patient Care Team:  Tenzin Alves MD as PCP - General ([facility])  Tenzin Alves MD as Assigned PCP    The following health maintenance items are reviewed in Epic and correct as of today:  Health Maintenance   Topic Date Due    RSV VACCINE (Pregnancy & 60+) (1 - 1-dose 60+ series) Never done    INFLUENZA VACCINE (1) 09/01/2023    COVID-19 Vaccine (4 - 2023-24 season) 09/01/2023    LIPID  04/19/2024    ANNUAL REVIEW OF HM ORDERS  04/19/2024    MEDICARE ANNUAL WELLNESS VISIT  04/19/2024    FALL RISK ASSESSMENT  04/26/2025    DTAP/TDAP/TD IMMUNIZATION (2 - Td or Tdap) 10/09/2025    GLUCOSE  04/19/2026    COLORECTAL CANCER SCREENING  04/14/2027    ADVANCE CARE PLANNING  04/22/2028    HEPATITIS C SCREENING  Completed    PHQ-2 (once per calendar year)  Completed    Pneumococcal Vaccine: 65+ Years  Completed    ZOSTER IMMUNIZATION  Completed    IPV IMMUNIZATION  Aged Out    HPV IMMUNIZATION  Aged Out    MENINGITIS IMMUNIZATION  Aged Out    RSV MONOCLONAL ANTIBODY  Aged Out            Objective    Exam  /78   Pulse 64   Temp 98  F (36.7  C) (Temporal)   Resp 20   Ht 1.77 m (5' 9.69\")   Wt 73.5 kg (162 lb)   SpO2 98%   BMI 23.46 kg/m     Estimated body mass index is 23.46 kg/m  as calculated from the following:    Height as of this encounter: 1.77 m (5' 9.69\").    Weight as of this encounter: 73.5 kg (162 lb).    Physical Exam  GENERAL: alert and no distress  NECK: no adenopathy, no asymmetry, " masses, or scars  RESP: lungs clear to auscultation - no rales, rhonchi or wheezes  CV: regular rate and rhythm, normal S1 S2, no S3 or S4, no murmur, click or rub, no peripheral edema  ABDOMEN: soft, nontender, no hepatosplenomegaly, no masses and bowel sounds normal  MS: no gross musculoskeletal defects noted, no edema  SKIN: multiple SKs and benign-appearing nevi.  One SK on back with irregular pigmentation.  Cryo done X 2.          4/26/2024   Mini Cog   Clock Draw Score 2 Normal   3 Item Recall 3 objects recalled   Mini Cog Total Score 5              Signed Electronically by: Tenzin Alves MD, MD

## 2024-04-27 NOTE — RESULT ENCOUNTER NOTE
Evan,    Your labs are all fairly normal.  Your blood sugar was still slightly elevated.  This is barely in the prediabetic range menses assume you are fasting for 12 hours).  Keep up your excellent work.  As we discussed, I will go ahead and add an A1c to your labs to ensure that this is not in a worrisome range.    Have a nice day!    Dr. Alves

## 2024-04-29 LAB — HBA1C MFR BLD: 5.6 % (ref 0–5.6)

## 2024-04-29 NOTE — RESULT ENCOUNTER NOTE
Evan,    Your A1c is technically in the normal range.  So your efforts at controlling your blood sugars are working.    Have a nice day!    Dr. Alves

## 2025-01-13 ENCOUNTER — ANCILLARY PROCEDURE (OUTPATIENT)
Dept: ULTRASOUND IMAGING | Facility: CLINIC | Age: 70
End: 2025-01-13
Payer: COMMERCIAL

## 2025-01-13 DIAGNOSIS — N48.89 PENILE MASS: ICD-10-CM

## 2025-01-13 PROCEDURE — 76857 US EXAM PELVIC LIMITED: CPT | Mod: GC | Performed by: RADIOLOGY

## 2025-01-13 NOTE — RESULT ENCOUNTER NOTE
Hello -    Here are my comments about the recent results. 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 This is nonspecific, possibly sequela of  prior trauma, inflammation, or infection. Recommend urology follow up, referral has been placed for you.       Please let us know if you have any questions or concerns.    Regards,  Carolann Agarwal PA-C

## 2025-01-15 ENCOUNTER — TELEPHONE (OUTPATIENT)
Dept: UROLOGY | Facility: CLINIC | Age: 70
End: 2025-01-15
Payer: COMMERCIAL

## 2025-01-15 NOTE — TELEPHONE ENCOUNTER
[facility] Call Center    Phone Message    May a detailed message be left on voicemail: yes     Reason for Call: Other: Pt has referral for Penile mass [N48.89], not listed in protocols. Please advise and call pt to schedule. Thanks      Action Taken: Other: uro    Travel Screening: Not Applicable     Date of Service:

## 2025-01-22 ENCOUNTER — OFFICE VISIT (OUTPATIENT)
Dept: UROLOGY | Facility: CLINIC | Age: 70
End: 2025-01-22
Payer: COMMERCIAL

## 2025-01-22 VITALS
DIASTOLIC BLOOD PRESSURE: 72 MMHG | TEMPERATURE: 96.4 F | HEIGHT: 70 IN | BODY MASS INDEX: 23.91 KG/M2 | SYSTOLIC BLOOD PRESSURE: 118 MMHG | WEIGHT: 167 LBS

## 2025-01-22 DIAGNOSIS — N48.6 PEYRONIE'S DISEASE: Primary | ICD-10-CM

## 2025-01-22 DIAGNOSIS — N48.89 PENILE MASS: ICD-10-CM

## 2025-01-22 PROCEDURE — 99203 OFFICE O/P NEW LOW 30 MIN: CPT | Performed by: UROLOGY

## 2025-01-22 ASSESSMENT — PAIN SCALES - GENERAL: PAINLEVEL_OUTOF10: NO PAIN (0)

## 2025-01-22 NOTE — PROGRESS NOTES
S: Patient is a pleasant 69-year-old male who was seen for a consultation with regard to patient's penile mass.  Patient has noticed a penile mass in the base of his penis for about a month.  He denies any urinary problems.  He has no pain with erections or penetrations.  Recent penile ultrasound showed:  Impression:   Diffuse calcifications throughout the corpus spongiosum most prominent  at the base of the penis. This is nonspecific, possibly sequela of  prior trauma, inflammation, or infection. Recommend urology  consultation.     I have personally reviewed the examination and initial interpretation  and I agree with the findings.     YIN KIRK MD   Current Outpatient Medications   Medication Sig Dispense Refill    aspirin (ASA) 81 MG EC tablet Take 1 tablet (81 mg) by mouth daily 90 tablet 3    atorvastatin (LIPITOR) 40 MG tablet Take 0.5 tablets (20 mg) by mouth daily 90 tablet 3    cetirizine (ZYRTEC) 10 MG tablet Take 10 mg by mouth daily      tamsulosin (FLOMAX) 0.4 MG capsule Take 1 capsule (0.4 mg) by mouth every evening 90 capsule 3     No Known Allergies  Past Medical History:   Diagnosis Date    Hyperlipidemia LDL goal < 130      Past Surgical History:   Procedure Laterality Date    COLONOSCOPY N/A 4/14/2022    Procedure: COLONOSCOPY, FLEXIBLE, WITH LESION REMOVAL USING SNARE;  Surgeon: Juancarlos Landa DO;  Location: [facility]    HERNIA REPAIR        Family History   Problem Relation Age of Onset    Hypertension Father     Cardiovascular Father 42        heart attack    Prostate Cancer Brother      Social History     Socioeconomic History    Marital status:      Spouse name: None    Number of children: None    Years of education: None    Highest education level: None   Tobacco Use    Smoking status: Never    Smokeless tobacco: Never   Vaping Use    Vaping status: Never Used   Substance and Sexual Activity    Alcohol use: Yes    Drug use: No    Sexual activity: Yes     Partners: Female    Other Topics Concern    Parent/sibling w/ CABG, MI or angioplasty before 65F 55M? No     Social Drivers of Health     Financial Resource Strain: Low Risk  (4/22/2024)    Financial Resource Strain     Within the past 12 months, have you or your family members you live with been unable to get utilities (heat, electricity) when it was really needed?: No   Food Insecurity: Low Risk  (4/22/2024)    Food Insecurity     Within the past 12 months, did you worry that your food would run out before you got money to buy more?: No     Within the past 12 months, did the food you bought just not last and you didn t have money to get more?: No   Transportation Needs: Low Risk  (4/22/2024)    Transportation Needs     Within the past 12 months, has lack of transportation kept you from medical appointments, getting your medicines, non-medical meetings or appointments, work, or from getting things that you need?: No   Physical Activity: Sufficiently Active (4/22/2024)    Exercise Vital Sign     Days of Exercise per Week: 5 days     Minutes of Exercise per Session: 60 min   Stress: No Stress Concern Present (4/22/2024)    [employer] - Occupational Stress Questionnaire     Feeling of Stress : Not at all   Social Connections: Unknown (4/22/2024)    Social Connection and Isolation Panel [NHANES]     Frequency of Social Gatherings with Friends and Family: Once a week   Interpersonal Safety: Low Risk  (1/10/2025)    Interpersonal Safety     Do you feel physically and emotionally safe where you currently live?: Yes     Within the past 12 months, have you been hit, slapped, kicked or otherwise physically hurt by someone?: No     Within the past 12 months, have you been humiliated or emotionally abused in other ways by your partner or ex-partner?: No   Housing Stability: Low Risk  (4/22/2024)    Housing Stability     Do you have housing? : Yes     Are you worried about losing your housing?: No       REVIEW OF  "SYSTEMS  =================  C: NEGATIVE for fever, chills, change in weight  I: NEGATIVE for worrisome rashes, moles or lesions  E/M: NEGATIVE for ear, mouth and throat problems  R: NEGATIVE for significant cough or SHORTNESS OF BREATH  CV:  NEGATIVE for chest pain, palpitations or peripheral edema  GI: NEGATIVE for nausea, abdominal pain, heartburn, or change in bowel habits  NEURO: NEGATIVE numbness/weakness  : see HPI  PSYCH: NEGATIVE depression/anxiety  LYmph: no new enlarged lymph nodes  Ortho: no new trauma/movements      Physical Exam:  /72 (BP Location: Right arm, Patient Position: Sitting, Cuff Size: Adult Regular)   Temp (!) 96.4  F (35.8  C) (Temporal)   Ht 1.77 m (5' 9.69\")   Wt 75.8 kg (167 lb)   BMI 24.18 kg/m     Patient is pleasant, in no acute distress, good general condition.  Heart:  negative, PMI normal  Lung: no evidence of respiratory distress    Abdomen: Soft, nondistended, non tender. No masses. No rebound or guarding.   Exam: penis with dorsal/lateral penile plague.  Testis no masses.  No scrotal skin lesion  Skin: Warm and dry.  No redness.  Neuro: grossly normal  Musculaskeletal: moving all extremities  Psych normal mood and affect  Musculoskeletal  moving all extremities  Hematologic/Lymphatic/Immunologic: normal ant/post cervical, axillary, supraclavicular and inguinal nodes    Assessment/Plan:   Peyronie's disease: Anatomy discussed.  Treatment options discussed with patient today.  We discussed observation, Xiaflex, and surgical correction.  Patient has no issues with this therefore observation only.  Follow-up with me as needed.       "

## 2025-05-13 SDOH — HEALTH STABILITY: PHYSICAL HEALTH: ON AVERAGE, HOW MANY DAYS PER WEEK DO YOU ENGAGE IN MODERATE TO STRENUOUS EXERCISE (LIKE A BRISK WALK)?: 5 DAYS

## 2025-05-13 SDOH — HEALTH STABILITY: PHYSICAL HEALTH: ON AVERAGE, HOW MANY MINUTES DO YOU ENGAGE IN EXERCISE AT THIS LEVEL?: 50 MIN

## 2025-05-13 ASSESSMENT — SOCIAL DETERMINANTS OF HEALTH (SDOH): HOW OFTEN DO YOU GET TOGETHER WITH FRIENDS OR RELATIVES?: TWICE A WEEK

## 2025-05-14 ENCOUNTER — RESULTS FOLLOW-UP (OUTPATIENT)
Dept: FAMILY MEDICINE | Facility: OTHER | Age: 70
End: 2025-05-14

## 2025-05-14 ENCOUNTER — OFFICE VISIT (OUTPATIENT)
Dept: FAMILY MEDICINE | Facility: OTHER | Age: 70
End: 2025-05-14
Attending: FAMILY MEDICINE
Payer: COMMERCIAL

## 2025-05-14 VITALS
SYSTOLIC BLOOD PRESSURE: 114 MMHG | HEART RATE: 68 BPM | DIASTOLIC BLOOD PRESSURE: 71 MMHG | OXYGEN SATURATION: 98 % | TEMPERATURE: 97.7 F | RESPIRATION RATE: 15 BRPM | HEIGHT: 69 IN | WEIGHT: 164 LBS | BODY MASS INDEX: 24.29 KG/M2

## 2025-05-14 DIAGNOSIS — E78.5 HYPERLIPIDEMIA WITH TARGET LDL LESS THAN 130: ICD-10-CM

## 2025-05-14 DIAGNOSIS — R39.14 BENIGN PROSTATIC HYPERPLASIA WITH INCOMPLETE BLADDER EMPTYING: ICD-10-CM

## 2025-05-14 DIAGNOSIS — R73.01 IMPAIRED FASTING GLUCOSE: ICD-10-CM

## 2025-05-14 DIAGNOSIS — Z00.00 ENCOUNTER FOR MEDICARE ANNUAL WELLNESS EXAM: Primary | ICD-10-CM

## 2025-05-14 DIAGNOSIS — N40.1 BENIGN PROSTATIC HYPERPLASIA WITH INCOMPLETE BLADDER EMPTYING: ICD-10-CM

## 2025-05-14 DIAGNOSIS — R01.1 HEART MURMUR, SYSTOLIC: ICD-10-CM

## 2025-05-14 DIAGNOSIS — Z80.42 FAMILY HISTORY OF PROSTATE CANCER: ICD-10-CM

## 2025-05-14 DIAGNOSIS — R73.03 PREDIABETES: ICD-10-CM

## 2025-05-14 DIAGNOSIS — Z12.5 SCREENING FOR PROSTATE CANCER: ICD-10-CM

## 2025-05-14 DIAGNOSIS — N48.6 PEYRONIE DISEASE: ICD-10-CM

## 2025-05-14 PROBLEM — T16.1XXA FOREIGN BODY OF RIGHT EAR, INITIAL ENCOUNTER: Status: RESOLVED | Noted: 2019-07-19 | Resolved: 2025-05-14

## 2025-05-14 LAB
ALBUMIN SERPL BCG-MCNC: 4.5 G/DL (ref 3.5–5.2)
ALP SERPL-CCNC: 66 U/L (ref 40–150)
ALT SERPL W P-5'-P-CCNC: 17 U/L (ref 0–70)
ANION GAP SERPL CALCULATED.3IONS-SCNC: 9 MMOL/L (ref 7–15)
AST SERPL W P-5'-P-CCNC: 17 U/L (ref 0–45)
BILIRUB SERPL-MCNC: 0.5 MG/DL
BUN SERPL-MCNC: 18.6 MG/DL (ref 8–23)
CALCIUM SERPL-MCNC: 9.4 MG/DL (ref 8.8–10.4)
CHLORIDE SERPL-SCNC: 105 MMOL/L (ref 98–107)
CHOLEST SERPL-MCNC: 129 MG/DL
CREAT SERPL-MCNC: 1.27 MG/DL (ref 0.67–1.17)
EGFRCR SERPLBLD CKD-EPI 2021: 62 ML/MIN/1.73M2
ERYTHROCYTE [DISTWIDTH] IN BLOOD BY AUTOMATED COUNT: 13.3 % (ref 10–15)
EST. AVERAGE GLUCOSE BLD GHB EST-MCNC: 117 MG/DL
FASTING STATUS PATIENT QL REPORTED: YES
FASTING STATUS PATIENT QL REPORTED: YES
GLUCOSE SERPL-MCNC: 86 MG/DL (ref 70–99)
HBA1C MFR BLD: 5.7 % (ref 0–5.6)
HCO3 SERPL-SCNC: 26 MMOL/L (ref 22–29)
HCT VFR BLD AUTO: 45.9 % (ref 40–53)
HDLC SERPL-MCNC: 57 MG/DL
HGB BLD-MCNC: 15.4 G/DL (ref 13.3–17.7)
LDLC SERPL CALC-MCNC: 59 MG/DL
MCH RBC QN AUTO: 32.2 PG (ref 26.5–33)
MCHC RBC AUTO-ENTMCNC: 33.6 G/DL (ref 31.5–36.5)
MCV RBC AUTO: 96 FL (ref 78–100)
NONHDLC SERPL-MCNC: 72 MG/DL
PLATELET # BLD AUTO: 190 10E3/UL (ref 150–450)
POTASSIUM SERPL-SCNC: 4.1 MMOL/L (ref 3.4–5.3)
PROT SERPL-MCNC: 7.1 G/DL (ref 6.4–8.3)
PSA SERPL DL<=0.01 NG/ML-MCNC: 2.37 NG/ML (ref 0–4.5)
RBC # BLD AUTO: 4.78 10E6/UL (ref 4.4–5.9)
SODIUM SERPL-SCNC: 140 MMOL/L (ref 135–145)
TRIGL SERPL-MCNC: 67 MG/DL
WBC # BLD AUTO: 7.5 10E3/UL (ref 4–11)

## 2025-05-14 PROCEDURE — 85027 COMPLETE CBC AUTOMATED: CPT | Performed by: FAMILY MEDICINE

## 2025-05-14 PROCEDURE — 3074F SYST BP LT 130 MM HG: CPT | Performed by: FAMILY MEDICINE

## 2025-05-14 PROCEDURE — G2211 COMPLEX E/M VISIT ADD ON: HCPCS | Performed by: FAMILY MEDICINE

## 2025-05-14 PROCEDURE — 80053 COMPREHEN METABOLIC PANEL: CPT | Performed by: FAMILY MEDICINE

## 2025-05-14 PROCEDURE — 1126F AMNT PAIN NOTED NONE PRSNT: CPT | Performed by: FAMILY MEDICINE

## 2025-05-14 PROCEDURE — 3078F DIAST BP <80 MM HG: CPT | Performed by: FAMILY MEDICINE

## 2025-05-14 PROCEDURE — G0439 PPPS, SUBSEQ VISIT: HCPCS | Performed by: FAMILY MEDICINE

## 2025-05-14 PROCEDURE — 80061 LIPID PANEL: CPT | Performed by: FAMILY MEDICINE

## 2025-05-14 PROCEDURE — G0103 PSA SCREENING: HCPCS | Performed by: FAMILY MEDICINE

## 2025-05-14 PROCEDURE — 36415 COLL VENOUS BLD VENIPUNCTURE: CPT | Performed by: FAMILY MEDICINE

## 2025-05-14 PROCEDURE — 83036 HEMOGLOBIN GLYCOSYLATED A1C: CPT | Performed by: FAMILY MEDICINE

## 2025-05-14 PROCEDURE — 99213 OFFICE O/P EST LOW 20 MIN: CPT | Mod: 25 | Performed by: FAMILY MEDICINE

## 2025-05-14 RX ORDER — ATORVASTATIN CALCIUM 40 MG/1
20 TABLET, FILM COATED ORAL DAILY
Qty: 90 TABLET | Refills: 3 | Status: SHIPPED | OUTPATIENT
Start: 2025-05-14

## 2025-05-14 RX ORDER — TAMSULOSIN HYDROCHLORIDE 0.4 MG/1
0.4 CAPSULE ORAL EVERY EVENING
Qty: 90 CAPSULE | Refills: 3 | Status: SHIPPED | OUTPATIENT
Start: 2025-05-14

## 2025-05-14 ASSESSMENT — PAIN SCALES - GENERAL: PAINLEVEL_OUTOF10: NO PAIN (0)

## 2025-05-14 NOTE — RESULT ENCOUNTER NOTE
Evan,    All of your labs were normal for you.  Very modest worsening of your A1c.  Continue to do what you are doing to try to help keep your blood sugars under control.  We should check this annually.    Kidney function is mildly reduced from previously.  This may be a simple aberration in the data.  Be sure you are drinking plenty of water and we should recheck this in 1 year.    Have a nice day!    Dr. Alves

## 2025-05-14 NOTE — PROGRESS NOTES
Preventive Care Visit  [facility]  Tenzin Alves MD, MD, Family Medicine  May 14, 2025  {Provider  Link to [facility] :[id number]}    Assessment & Plan     ASSESSMENT/ORDERS:    ICD-10-CM    1. Encounter for Medicare annual wellness exam  Z00.00       2. Benign prostatic hyperplasia with incomplete bladder emptying  N40.1 tamsulosin (FLOMAX) 0.4 MG capsule    R39.14 PSA, screen     PSA, screen      3. Impaired fasting glucose  R73.01 Hemoglobin A1c     Hemoglobin A1c      4. Prediabetes  R73.03       5. Hyperlipidemia with target LDL less than 130  E78.5 Lipid panel reflex to direct LDL Fasting     atorvastatin (LIPITOR) 40 MG tablet     Comprehensive metabolic panel (BMP + Alb, Alk Phos, ALT, AST, Total. Bili, TP)     CBC with platelets     Lipid panel reflex to direct LDL Fasting     Comprehensive metabolic panel (BMP + Alb, Alk Phos, ALT, AST, Total. Bili, TP)     CBC with platelets      6. Peyronie disease  N48.6       7. Family history of prostate cancer  Z80.42 PSA, screen     PSA, screen      8. Screening for prostate cancer  Z12.5 PSA, screen     PSA, screen      9. Heart murmur, systolic  R01.1 CBC with platelets     Echocardiogram Complete     CBC with platelets        PLAN:  Evan Rios is a 69 year old male with a history of hyperlipidemia and BPH who presents today for annual wellness visit without concerns.  Yosvany notes his tamsulosin prescription ran out in the last two weeks and his bladder has felt noticeably full postvoid. This was refilled.  3, 4. Rechecked A1c today given previous borderline value on last check. This resulted after the patient left and was elevated into the prediabetic range. I will encourage the patient to continue good diet and exercise practices and we will monitor this again at next visit.  5. The patient's hyperlipidemia remains controlled well with atorvastatin. This is refilled. Lipids rechecked today and are stable.  6. This year Yosvany was seen  for a penile mass that was found to be Peyronie disease. Per discussion with Dr. Pandey, the patient elected conservative management. Continuing to monitor for changes/worsening.  7, 8. Given the patient's family history of prostate cancer, PSA was rechecked today and is within normal limits.  9. On exam today the patient has a new systolic murmur. No chest pain or SOB. No LE edema. He feels well. We discussed risks and benefits and he is interested in an echocardiogram to evaluate. Obtained CBC as well, which resulted within normal limits.    The longitudinal plan of care for the diagnosis(es)/condition(s) as documented were addressed during this visit. Due to the added complexity in care, I will continue to support Evan in the subsequent management and with ongoing continuity of care.       Counseling  Appropriate preventive services were addressed with this patient via screening, questionnaire, or discussion as appropriate for fall prevention, nutrition, physical activity, Tobacco-use cessation, social engagement, weight loss and cognition.  Checklist reviewing preventive services available has been given to the patient.  Reviewed patient's diet, addressing concerns and/or questions.   The patient was provided with written information regarding signs of hearing loss.     Subjective   Evan is a 69 year old, presenting for the following:  Wellness Visit        5/14/2025     9:54 AM   Additional Questions   Roomed by frederick   Accompanied by self     {ROOMER if patient is in their first year of Medicare a vision screen is required click here to document the [employer] screen and then refresh the note to pull in results  :[id number]}      HPI  Patient feels overall excellent. No acute concerns today. He has been exercising well and decreasing his high glycemic index foods in an effort to keep his A1c in normal range. He walks daily and gardens often.    In the past year he was seen by Dr. Pandey for a penile lump and was  found to have peyronie disease. He states this is stable and at this time is electing conservative management with observation.       Advance Care Planning  {The storyboard will display whether the patient has ACP docs on file. Hover over the Code section in the storyboard to access the ACP documents. :[id number]}  Patient states has Health Care Directive and will send to [employer] [employer].        5/13/2025   General Health   How would you rate your overall physical health? Excellent   Feel stress (tense, anxious, or unable to sleep) Not at all         5/13/2025   Nutrition   Diet: Regular (no restrictions)         5/13/2025   Exercise   Days per week of moderate/strenous exercise 5 days   Average minutes spent exercising at this level 50 min         5/13/2025   Social Factors   Frequency of gathering with friends or relatives Twice a week   Worry food won't last until get money to buy more No   Food not last or not have enough money for food? No   Do you have housing? (Housing is defined as stable permanent housing and does not include staying outside in a car, in a tent, in an abandoned building, in an overnight shelter, or couch-surfing.) Yes   Are you worried about losing your housing? No   Lack of transportation? No   Unable to get utilities (heat,electricity)? No         5/13/2025   Fall Risk   Fallen 2 or more times in the past year? No   Trouble with walking or balance? No          5/13/2025   Activities of Daily Living- Home Safety   Needs help with the following daily activites None of the above   Safety concerns in the home None of the above         5/13/2025   Dental   Dentist two times every year? Yes         5/13/2025   Hearing Screening   Hearing concerns? (!) IT'S HARD TO FOLLOW A CONVERSATION IN A NOISY RESTAURANT OR CROWDED ROOM.   Uses hearing aids.        5/13/2025   Driving Risk Screening   Patient/family members have concerns about driving No         5/13/2025   General Alertness/Fatigue Screening    Have you been more tired than usual lately? No         5/13/2025   Urinary Incontinence Screening   Bothered by leaking urine in past 6 months No         Today's PHQ-2 Score:       5/13/2025     6:44 PM   PHQ-2 ( 1999 Pfizer)   Q1: Little interest or pleasure in doing things 0   Q2: Feeling down, depressed or hopeless 0   PHQ-2 Score 0    Q1: Little interest or pleasure in doing things Not at all   Q2: Feeling down, depressed or hopeless Not at all   PHQ-2 Score 0       Patient-reported           5/13/2025   Substance Use   Alcohol more than 3/day or more than 7/wk No   Do you have a current opioid prescription? No   How severe/bad is pain from 1 to 10? 0/10 (No Pain)   Do you use any other substances recreationally? No     Social History     Tobacco Use    Smoking status: Never    Smokeless tobacco: Never   Vaping Use    Vaping status: Never Used   Substance Use Topics    Alcohol use: Yes    Drug use: No     {Provider  If there are gaps in the social history shown above, please follow the link to update and then refresh the note Link to Social and Substance History :[id number]}      5/13/2025   AAA Screening   Family history of Abdominal Aortic Aneurysm (AAA)? Unsure   Last PSA:   PSA   Date Value Ref Range Status   02/07/2020 1.97 0 - 4 ug/L Final     Comment:     Assay Method:  Chemiluminescence using Siemens Vista analyzer     Prostate Specific Antigen Screen   Date Value Ref Range Status   05/14/2025 2.37 0.00 - 4.50 ng/mL Final   03/04/2022 2.34 0.00 - 4.00 ug/L Final     ASCVD Risk   The ASCVD Risk score (Ronaldo BRITO, et al., 2019) failed to calculate for the following reasons:    The valid total cholesterol range is 130 to 320 mg/dL    {Provider  REQUIRED FOR AWV Use the storyboard to review patient history, after sections have been marked as reviewed, refresh note to capture documentation:430065}  {Provider   REQUIRED AWV use this link to review and update sexual activity history  after section has  been marked as reviewed, refresh note to capture documentation:[id number]}  Reviewed and updated as needed this visit by Provider                    Past Medical History:   Diagnosis Date    Foreign body of right ear, initial encounter 07/19/2019    Hyperlipidemia LDL goal < 130      Past Surgical History:   Procedure Laterality Date    COLONOSCOPY N/A 4/14/2022    Procedure: COLONOSCOPY, FLEXIBLE, WITH LESION REMOVAL USING SNARE;  Surgeon: Juancarlos Landa DO;  Location: [facility]    HERNIA REPAIR       BP Readings from Last 3 Encounters:   05/14/25 114/71   01/22/25 118/72   01/10/25 138/79    Wt Readings from Last 3 Encounters:   05/14/25 74.4 kg (164 lb)   01/22/25 75.8 kg (167 lb)   01/10/25 75.8 kg (167 lb)                  Patient Active Problem List   Diagnosis    Hyperlipidemia with target LDL less than 130    Vertigo    Ganglion cyst    AK (actinic keratosis)    Raynaud's phenomenon without gangrene    Family history of prostate cancer    Benign prostatic hyperplasia with incomplete bladder emptying    Peyronie disease     Past Surgical History:   Procedure Laterality Date    COLONOSCOPY N/A 4/14/2022    Procedure: COLONOSCOPY, FLEXIBLE, WITH LESION REMOVAL USING SNARE;  Surgeon: Juancarlos Landa DO;  Location:  GI    HERNIA REPAIR         Social History     Tobacco Use    Smoking status: Never    Smokeless tobacco: Never   Substance Use Topics    Alcohol use: Yes     Family History   Problem Relation Age of Onset    Hypertension Father     Cardiovascular Father 42        heart attack    Prostate Cancer Brother          Current Outpatient Medications   Medication Sig Dispense Refill    aspirin (ASA) 81 MG EC tablet Take 1 tablet (81 mg) by mouth daily 90 tablet 3    atorvastatin (LIPITOR) 40 MG tablet Take 0.5 tablets (20 mg) by mouth daily. 90 tablet 3    cetirizine (ZYRTEC) 10 MG tablet Take 10 mg by mouth daily      tamsulosin (FLOMAX) 0.4 MG capsule Take 1 capsule (0.4 mg) by mouth every  "evening. 90 capsule 3     No Known Allergies  Current providers sharing in care for this patient include:  Patient Care Team:  Tenzin Alves MD as PCP - General (Family Medicine)  Tenzin Alves MD as Assigned PCP  Stevenson Pandey MD as Assigned Surgical Provider    The following health maintenance items are reviewed in Epic and correct as of today:  Health Maintenance   Topic Date Due    COVID-19 Vaccine (4 - 2024-25 season) 09/01/2024    MEDICARE ANNUAL WELLNESS VISIT  04/26/2025    INFLUENZA VACCINE (Season Ended) 09/01/2025    DTAP/TDAP/TD IMMUNIZATION (2 - Td or Tdap) 10/09/2025    LIPID  05/14/2026    ANNUAL REVIEW OF HM ORDERS  05/14/2026    FALL RISK ASSESSMENT  05/14/2026    COLORECTAL CANCER SCREENING  04/14/2027    DIABETES SCREENING  05/14/2028    ADVANCE CARE PLANNING  05/14/2030    RSV VACCINE (1 - 1-dose 75+ series) 05/16/2031    HEPATITIS C SCREENING  Completed    PHQ-2 (once per calendar year)  Completed    Pneumococcal Vaccine: 50+ Years  Completed    ZOSTER IMMUNIZATION  Completed    HPV IMMUNIZATION  Aged Out    MENINGITIS IMMUNIZATION  Aged Out        Objective    Exam  /71   Pulse 68   Temp 97.7  F (36.5  C) (Temporal)   Resp 15   Ht 1.76 m (5' 9.29\")   Wt 74.4 kg (164 lb)   SpO2 98%   BMI 24.02 kg/m     Estimated body mass index is 24.02 kg/m  as calculated from the following:    Height as of this encounter: 1.76 m (5' 9.29\").    Weight as of this encounter: 74.4 kg (164 lb).    Physical Exam  Vitals and nursing note reviewed.   Constitutional:       General: He is not in acute distress.     Appearance: He is not toxic-appearing or diaphoretic.   HENT:      Head: Normocephalic and atraumatic.      Right Ear: Tympanic membrane, ear canal and external ear normal. There is no impacted cerumen.      Left Ear: Tympanic membrane, ear canal and external ear normal. There is no impacted cerumen.      Nose: Nose normal.      Mouth/Throat:      Mouth: Mucous membranes " are moist.      Pharynx: No oropharyngeal exudate or posterior oropharyngeal erythema.   Eyes:      General: No scleral icterus.     Extraocular Movements: Extraocular movements intact.      Conjunctiva/sclera: Conjunctivae normal.      Pupils: Pupils are equal, round, and reactive to light.   Cardiovascular:      Rate and Rhythm: Normal rate and regular rhythm.      Comments: 2/6 early systolic murmur loudest at LLSB  Pulmonary:      Effort: Pulmonary effort is normal. No respiratory distress.      Breath sounds: Normal breath sounds. No wheezing, rhonchi or rales.   Abdominal:      General: Bowel sounds are normal.      Palpations: Abdomen is soft.      Tenderness: There is no right CVA tenderness or left CVA tenderness.   Musculoskeletal:         General: No tenderness. Normal range of motion.      Cervical back: Normal range of motion and neck supple. No tenderness.      Right lower leg: No edema.      Left lower leg: No edema.   Lymphadenopathy:      Cervical: No cervical adenopathy.   Skin:     General: Skin is warm.      Capillary Refill: Capillary refill takes less than 2 seconds.      Comments: Age-related skin changes with increased areas of pigmentation, increased on arms and top of head   Neurological:      General: No focal deficit present.      Mental Status: He is alert and oriented to person, place, and time.      Deep Tendon Reflexes: Reflexes normal.   Psychiatric:         Mood and Affect: Mood normal.         Behavior: Behavior normal.         Thought Content: Thought content normal.         Judgment: Judgment normal.           5/14/2025   Mini Cog   Clock Draw Score 2 Normal   3 Item Recall 3 objects recalled   Mini Cog Total Score 5     {A Mini-Cog total score of 0-2 suggests the possibility of dementia, score of 3-5 suggests no dementia:639707}         Results for orders placed or performed in visit on 05/14/25   Lipid panel reflex to direct LDL Fasting     Status: None   Result Value Ref Range     Cholesterol 129 <200 mg/dL    Triglycerides 67 <150 mg/dL    Direct Measure HDL 57 >=40 mg/dL    LDL Cholesterol Calculated 59 <100 mg/dL    Non HDL Cholesterol 72 <130 mg/dL    Patient Fasting > 8hrs? Yes     Narrative    Cholesterol  Desirable: < 200 mg/dL  Borderline High: 200 - 239 mg/dL  High: >= 240 mg/dL    Triglycerides  Normal: < 150 mg/dL  Borderline High: 150 - 199 mg/dL  High: 200-499 mg/dL  Very High: >= 500 mg/dL    Direct Measure HDL  Female: >= 50 mg/dL   Male: >= 40 mg/dL    LDL Cholesterol  Desirable: < 100 mg/dL  Above Desirable: 100 - 129 mg/dL   Borderline High: 130 - 159 mg/dL   High:  160 - 189 mg/dL   Very High: >= 190 mg/dL    Non HDL Cholesterol  Desirable: < 130 mg/dL  Above Desirable: 130 - 159 mg/dL  Borderline High: 160 - 189 mg/dL  High: 190 - 219 mg/dL  Very High: >= 220 mg/dL   PSA, screen     Status: Normal   Result Value Ref Range    Prostate Specific Antigen Screen 2.37 0.00 - 4.50 ng/mL    Narrative    This result is obtained using the Roche Elecsys total PSA method on the sherrell e601 immunoassay analyzer, which is an ultrasensitive method. Results obtained with different assay methods or kits cannot be used interchangeably.  This test is intended for initial prostate cancer screening. PSA values exceeding the age-specific limits are suspicious for prostate disease, but additional testing, such as prostate biopsy, is needed to diagnose prostate pathology. The American Cancer Society recommends annual examination with digital rectal examination and serum PSA beginning at age 50 and for men with a life expectancy of at least 10 years after detection of prostate cancer. For men in high-risk groups, such as  Americans or men with a first-degree relative diagnosed at a younger age, testing should begin at a younger age. It is generally recommended that information be provided to patients about the benefits and limitations of testing and treatment so they can make informed  decisions.   Hemoglobin A1c     Status: Abnormal   Result Value Ref Range    Estimated Average Glucose 117 (H) <117 mg/dL    Hemoglobin A1C 5.7 (H) 0.0 - 5.6 %   Comprehensive metabolic panel (BMP + Alb, Alk Phos, ALT, AST, Total. Bili, TP)     Status: Abnormal   Result Value Ref Range    Sodium 140 135 - 145 mmol/L    Potassium 4.1 3.4 - 5.3 mmol/L    Carbon Dioxide (CO2) 26 22 - 29 mmol/L    Anion Gap 9 7 - 15 mmol/L    Urea Nitrogen 18.6 8.0 - 23.0 mg/dL    Creatinine 1.27 (H) 0.67 - 1.17 mg/dL    GFR Estimate 62 >60 mL/min/1.73m2    Calcium 9.4 8.8 - 10.4 mg/dL    Chloride 105 98 - 107 mmol/L    Glucose 86 70 - 99 mg/dL    Alkaline Phosphatase 66 40 - 150 U/L    AST 17 0 - 45 U/L    ALT 17 0 - 70 U/L    Protein Total 7.1 6.4 - 8.3 g/dL    Albumin 4.5 3.5 - 5.2 g/dL    Bilirubin Total 0.5 <=1.2 mg/dL    Patient Fasting > 8hrs? Yes    CBC with platelets     Status: Normal   Result Value Ref Range    WBC Count 7.5 4.0 - 11.0 10e3/uL    RBC Count 4.78 4.40 - 5.90 10e6/uL    Hemoglobin 15.4 13.3 - 17.7 g/dL    Hematocrit 45.9 40.0 - 53.0 %    MCV 96 78 - 100 fL    MCH 32.2 26.5 - 33.0 pg    MCHC 33.6 31.5 - 36.5 g/dL    RDW 13.3 10.0 - 15.0 %    Platelet Count 190 150 - 450 10e3/uL       Patient was seen and examined by myself and Dr. Alves. The note was then documented by me.    Ruiz Vora, MS4  [employer]  05/14/25    I was present with the medical student who participated in the service and in the documentation of the note. I have verified the history and personally performed the physical exam and medical decision making. I reviewed the note in detail and edited it appropriately. I agree with the assessment and plan of care as documented in the note.    Signed Electronically by: Tenzin Alves MD, MD  {Email feedback regarding this note to [email or web address]   :[id number]}

## 2025-05-14 NOTE — PATIENT INSTRUCTIONS
Let's make sure your echo is OK.      We will let you know your results as soon as we can.  If you have MyChart, you will be able to see your lab and imaging results shortly after they become available.  I will review these and let you know my interpretation, but this usually takes additional time.  If you have multiple labs, I usually wait until they are all back before sending a note about them unless something is worrisome.  If you do not have MyChart, we will let you know your lab results as soon as we can.  If they are normal, this can take up to a week.     Contact us or return if questions or concerns.    Have a nice day!    Dr. Alves      Patient Education   Preventive Care Advice   This is general advice given by our system to help you stay healthy. However, your care team may have specific advice just for you. Please talk to your care team about your preventive care needs.  Nutrition  Eat 5 or more servings of fruits and vegetables each day.  Try wheat bread, brown rice and whole grain pasta (instead of white bread, rice, and pasta).  Get enough calcium and vitamin D. Check the label on foods and aim for 100% of the RDA (recommended daily allowance).  Lifestyle  Exercise at least 150 minutes each week  (30 minutes a day, 5 days a week).  Do muscle strengthening activities 2 days a week. These help control your weight and prevent disease.  No smoking.  Wear sunscreen to prevent skin cancer.  Have a dental exam and cleaning every 6 months.  Yearly exams  See your health care team every year to talk about:  Any changes in your health.  Any medicines your care team has prescribed.  Preventive care, family planning, and ways to prevent chronic diseases.  Shots (vaccines)   HPV shots (up to age 26), if you've never had them before.  Hepatitis B shots (up to age 59), if you've never had them before.  COVID-19 shot: Get this shot when it's due.  Flu shot: Get a flu shot every year.  Tetanus shot: Get a tetanus  shot every 10 years.  Pneumococcal, hepatitis A, and RSV shots: Ask your care team if you need these based on your risk.  Shingles shot (for age 50 and up)  General health tests  Diabetes screening:  Starting at age 35, Get screened for diabetes at least every 3 years.  If you are younger than age 35, ask your care team if you should be screened for diabetes.  Cholesterol test: At age 39, start having a cholesterol test every 5 years, or more often if advised.  Bone density scan (DEXA): At age 50, ask your care team if you should have this scan for osteoporosis (brittle bones).  Hepatitis C: Get tested at least once in your life.  STIs (sexually transmitted infections)  Before age 24: Ask your care team if you should be screened for STIs.  After age 24: Get screened for STIs if you're at risk. You are at risk for STIs (including HIV) if:  You are sexually active with more than one person.  You don't use condoms every time.  You or a partner was diagnosed with a sexually transmitted infection.  If you are at risk for HIV, ask about PrEP medicine to prevent HIV.  Get tested for HIV at least once in your life, whether you are at risk for HIV or not.  Cancer screening tests  Cervical cancer screening: If you have a cervix, begin getting regular cervical cancer screening tests starting at age 21.  Breast cancer scan (mammogram): If you've ever had breasts, begin having regular mammograms starting at age 40. This is a scan to check for breast cancer.  Colon cancer screening: It is important to start screening for colon cancer at age 45.  Have a colonoscopy test every 10 years (or more often if you're at risk) Or, ask your provider about stool tests like a FIT test every year or Cologuard test every 3 years.  To learn more about your testing options, visit:   .  For help making a decision, visit:   https://bit.ly/ba79430.  Prostate cancer screening test: If you have a prostate, ask your care team if a prostate cancer  screening test (PSA) at age 55 is right for you.  Lung cancer screening: If you are a current or former smoker ages 50 to 80, ask your care team if ongoing lung cancer screenings are right for you.  For informational purposes only. Not to replace the advice of your health care provider. Copyright   2023 [employer]. All rights reserved. Clinically reviewed by the [facility]. [employer] 353871 - REV 01/24.  Hearing Loss: Care Instructions  Overview     Hearing loss is a sudden or slow decrease in how well you hear. It can range from slight to profound. Permanent hearing loss can occur with aging. It also can happen when you are exposed long-term to loud noise. Examples include listening to loud music, riding motorcycles, or being around other loud machines.  Hearing loss can affect your work and home life. It can make you feel lonely or depressed. You may feel that you have lost your independence. But hearing aids and other devices can help you hear better and feel connected to others.  Follow-up care is a key part of your treatment and safety. Be sure to make and go to all appointments, and call your doctor if you are having problems. It's also a good idea to know your test results and keep a list of the medicines you take.  How can you care for yourself at home?  Avoid loud noises whenever possible. This helps keep your hearing from getting worse.  Always wear hearing protection around loud noises.  Wear a hearing aid as directed.  A professional can help you pick a hearing aid that will work best for you.  You can also get hearing aids over the counter for mild to moderate hearing loss.  Have hearing tests as your doctor suggests. They can show whether your hearing has changed. Your hearing aid may need to be adjusted.  Use other devices as needed. These may include:  Telephone amplifiers and hearing aids that can connect to a television, stereo, radio, or  "microphone.  Devices that use lights or vibrations. These alert you to the doorbell, a ringing telephone, or a baby monitor.  Television closed-captioning. This shows the words at the bottom of the screen. Most new TVs can do this.  TTY (text telephone). This lets you type messages back and forth on the telephone instead of talking or listening. These devices are also called TDD. When messages are typed on the keyboard, they are sent over the phone line to a receiving TTY. The message is shown on a monitor.  Use text messaging, social media, and email if it is hard for you to communicate by telephone.  Try to learn a listening technique called speechreading. It is not lipreading. You pay attention to people's gestures, expressions, posture, and tone of voice. These clues can help you understand what a person is saying. Face the person you are talking to, and have them face you. Make sure the lighting is good. You need to see the other person's face clearly.  Think about counseling if you need help to adjust to your hearing loss.  When should you call for help?  Watch closely for changes in your health, and be sure to contact your doctor if:    You think your hearing is getting worse.     You have new symptoms, such as dizziness or nausea.   Where can you learn more?  Go to https://www.[employer].net/patiented  Enter R798 in the search box to learn more about \"Hearing Loss: Care Instructions.\"  Current as of: October 27, 2024  Content Version: 14.4    [id number] [employer]   Care instructions adapted under license by your healthcare professional. If you have questions about a medical condition or this instruction, always ask your healthcare professional. [employer] disclaims any warranty or liability for your use of this information.       "

## 2025-06-11 ENCOUNTER — HOSPITAL ENCOUNTER (OUTPATIENT)
Dept: CARDIOLOGY | Facility: CLINIC | Age: 70
Discharge: HOME | End: 2025-06-11
Attending: FAMILY MEDICINE
Payer: COMMERCIAL

## 2025-06-11 DIAGNOSIS — R01.1 HEART MURMUR, SYSTOLIC: ICD-10-CM

## 2025-06-11 LAB — LVEF ECHO: NORMAL

## 2025-06-11 PROCEDURE — 93306 TTE W/DOPPLER COMPLETE: CPT

## (undated) DEVICE — TUBING SUCTION 12"X1/4" N612

## (undated) DEVICE — GLOVE EXAM NITRILE LG

## (undated) DEVICE — LUBRICATING JELLY 4.25OZ

## (undated) DEVICE — SOL WATER IRRIG 1000ML BOTTLE 07139-09

## (undated) DEVICE — KIT ENDO TURNOVER/PROCEDURE CARRY-ON 101822